# Patient Record
Sex: FEMALE | Race: WHITE | NOT HISPANIC OR LATINO | Employment: OTHER | RURAL
[De-identification: names, ages, dates, MRNs, and addresses within clinical notes are randomized per-mention and may not be internally consistent; named-entity substitution may affect disease eponyms.]

---

## 2020-03-13 ENCOUNTER — OFFICE VISIT (OUTPATIENT)
Dept: OTOLARYNGOLOGY | Facility: CLINIC | Age: 75
End: 2020-03-13

## 2020-03-13 VITALS — BODY MASS INDEX: 30.3 KG/M2 | OXYGEN SATURATION: 92 % | WEIGHT: 171 LBS | HEIGHT: 63 IN

## 2020-03-13 DIAGNOSIS — J31.0 CHRONIC RHINITIS: ICD-10-CM

## 2020-03-13 DIAGNOSIS — J37.0 CHRONIC LARYNGITIS: ICD-10-CM

## 2020-03-13 DIAGNOSIS — R09.89 GLOBUS SENSATION: Primary | ICD-10-CM

## 2020-03-13 DIAGNOSIS — K21.9 GASTROESOPHAGEAL REFLUX DISEASE, ESOPHAGITIS PRESENCE NOT SPECIFIED: ICD-10-CM

## 2020-03-13 PROCEDURE — 99203 OFFICE O/P NEW LOW 30 MIN: CPT | Performed by: OTOLARYNGOLOGY

## 2020-03-13 PROCEDURE — 31575 DIAGNOSTIC LARYNGOSCOPY: CPT | Performed by: OTOLARYNGOLOGY

## 2020-03-13 RX ORDER — LORAZEPAM 0.5 MG/1
0.25 TABLET ORAL EVERY 12 HOURS SCHEDULED
COMMUNITY

## 2020-03-13 RX ORDER — TACROLIMUS 1 MG/1
1 CAPSULE ORAL
COMMUNITY

## 2020-03-13 RX ORDER — LEVOFLOXACIN 250 MG/1
TABLET ORAL
COMMUNITY
End: 2020-06-16

## 2020-03-13 RX ORDER — CETIRIZINE HYDROCHLORIDE 10 MG/1
TABLET ORAL
COMMUNITY

## 2020-03-13 RX ORDER — PROPRANOLOL HYDROCHLORIDE 20 MG/1
20 TABLET ORAL DAILY
COMMUNITY

## 2020-03-13 RX ORDER — ATORVASTATIN CALCIUM 20 MG/1
TABLET, FILM COATED ORAL
COMMUNITY

## 2020-03-13 RX ORDER — AZELASTINE 1 MG/ML
SPRAY, METERED NASAL
COMMUNITY
Start: 2020-01-09 | End: 2020-06-16

## 2020-03-13 RX ORDER — AMLODIPINE BESYLATE 10 MG/1
TABLET ORAL
COMMUNITY

## 2020-03-13 RX ORDER — SERTRALINE HYDROCHLORIDE 100 MG/1
100 TABLET, FILM COATED ORAL
COMMUNITY

## 2020-03-13 RX ORDER — FLUTICASONE PROPIONATE 50 MCG
2 SPRAY, SUSPENSION (ML) NASAL DAILY
Qty: 16 G | Refills: 11 | Status: SHIPPED | OUTPATIENT
Start: 2020-03-13

## 2020-03-13 RX ORDER — FAMOTIDINE 20 MG/1
TABLET, FILM COATED ORAL
COMMUNITY
End: 2020-03-13

## 2020-03-13 RX ORDER — PANTOPRAZOLE SODIUM 40 MG/1
40 TABLET, DELAYED RELEASE ORAL DAILY
Qty: 30 TABLET | Refills: 11 | Status: SHIPPED | OUTPATIENT
Start: 2020-03-13 | End: 2020-05-15 | Stop reason: ALTCHOICE

## 2020-03-13 RX ORDER — CLOPIDOGREL BISULFATE 75 MG/1
TABLET ORAL
COMMUNITY
End: 2020-06-16

## 2020-03-17 NOTE — PROGRESS NOTES
"Subjective   Rocio Sánchez is a 75 y.o. female.     History of Present Illness   Patient presents with complaints of frequent throat clearing.  Always feels like she has mucus in her throat.  Symptoms are worse after eating.  Is on famotidine 20 mg a day but still has breakthrough symptoms of reflux despite this.  Also has intermittent nasal congestion and rhinorrhea that is present despite using Zyrtec.  No purulent rhinorrhea.  No hemoptysis.  Symptoms have been present daily for months.      The following portions of the patient's history were reviewed and updated as appropriate: allergies, current medications, past family history, past medical history, past social history, past surgical history and problem list.      Rocio Sánchez reports that she has quit smoking. She has never used smokeless tobacco.  Patient is not a tobacco user and has not been counseled for use of tobacco products    Family History   Problem Relation Age of Onset   • Diabetes Father    • Heart disease Father        Allergies   Allergen Reactions   • Phenergan [Promethazine Hcl] Mental Status Change     \"jittery\" cant be still         Current Outpatient Medications:   •  amLODIPine (NORVASC) 10 MG tablet, amlodipine 10 mg tablet  Take 1 tablet daily, Disp: , Rfl:   •  atorvastatin (LIPITOR) 20 MG tablet, atorvastatin 20 mg tablet  Take 1 tablet daily, Disp: , Rfl:   •  azelastine (ASTELIN) 0.1 % nasal spray, , Disp: , Rfl:   •  Calcium Carbonate (CALCIUM 600 PO), Calcium 600  Take 4 tablets per week, Disp: , Rfl:   •  cetirizine (ZYRTEC ALLERGY) 10 MG tablet, Zyrtec 10 mg tablet  Take 1 tablet every day by oral route., Disp: , Rfl:   •  Cholecalciferol (EQL VITAMIN D3) 50 MCG (2000 UT) capsule, D3-2000 50 mcg (2,000 unit) capsule  Take 1 tablet daily, Disp: , Rfl:   •  clopidogrel (PLAVIX) 75 MG tablet, clopidogrel 75 mg tablet  Take 1 tablet daily, Disp: , Rfl:   •  fluticasone (FLONASE) 50 MCG/ACT nasal spray, 2 sprays into the " nostril(s) as directed by provider Daily., Disp: 16 g, Rfl: 11  •  levoFLOXacin (LEVAQUIN) 250 MG tablet, levofloxacin 250 mg tablet, Disp: , Rfl:   •  LORazepam (ATIVAN) 0.5 MG tablet, Every 12 (Twelve) Hours., Disp: , Rfl:   •  Magnesium Gluconate (MAGNESIUM 27) 500 (27 Mg) MG tablet, magnesium 500 mg tablet  Take 4 tablets weekly, Disp: , Rfl:   •  Omega 3-6-9 Fatty Acids (OMEGA 3-6-9 PO), Adult Multi plus Omega-3, Disp: , Rfl:   •  pantoprazole (PROTONIX) 40 MG EC tablet, Take 1 tablet by mouth Daily., Disp: 30 tablet, Rfl: 11  •  Potassium 99 MG tablet, potassium 99 mg tablet  Take 4 tablets per week, Disp: , Rfl:   •  propranolol (INDERAL) 20 MG tablet, propranolol 20 mg tablet  Take 1 tablet twice daily, Disp: , Rfl:   •  sertraline (ZOLOFT) 100 MG tablet, sertraline 100 mg tablet  Take 1 tablet daily, Disp: , Rfl:   •  tacrolimus (PROGRAF) 1 MG capsule, tacrolimus 1 mg capsule  Take 4 tablets BID, Disp: , Rfl:     Past Medical History:   Diagnosis Date   • Disease of thyroid gland    • High blood pressure    • Sinusitis        Past Surgical History:   Procedure Laterality Date   • CHOLECYSTECTOMY     • HYSTERECTOMY     • LIVER TRANSPLANTATION           Review of Systems   HENT: Positive for congestion, postnasal drip and sneezing.    Eyes: Positive for itching.   Respiratory: Positive for cough and shortness of breath.    Gastrointestinal: Positive for diarrhea.   Endocrine: Positive for heat intolerance.   Neurological: Positive for dizziness and weakness.   Hematological: Bruises/bleeds easily.   Psychiatric/Behavioral:        Not assessed   All other systems reviewed and are negative.          Objective   Physical Exam  General: Well-developed well-nourished female in no acute distress.  Alert and oriented x-3. Head: Normocephalic. Face: Symmetrical strength and appearance. PERRL. EOMI. Voice: Slightly harsh.  Clears her throat frequently during the exam. Speech:Fluent  Ears: External ears no deformity,  canals no discharge, tympanic membranes intact clear and mobile bilaterally.  Nose: Nares show no discharge mass polyp or purulence.  Boggy mucosa is present.  No gross external deformity.  Septum: To the right  Oral cavity: Lips and gums without lesions.  Tongue and floor of mouth without lesions.  Parotid and submandibular ducts unobstructed.  No mucosal lesions on the buccal mucosa or vestibule of the mouth.  Pharynx: No erythema exudate mass or ulcer.  Tonsils absent.  Neck: No lymphadenopathy.  No thyromegaly.  Trachea and larynx midline.  No masses in the parotid or submandibular glands.    Procedure Note    Pre-operative Diagnosis: Patient presents with:  Sore Throat      Post-operative Diagnosis: Chronic laryngitis    Anesthesia: topical with xylocaine and neosynephrine    Endoscopy Type:  Flexible Laryngoscopy    Procedure Details:    The patient was placed in the sitting position.  After topical anesthesia and decongestion, the 4 mm laryngoscope was passed.  The nasal cavities, nasopharynx, oropharynx, hypopharynx, and larynx were all examined.  Vocal cords were examined during respiration and phonation.  The following findings were noted:    Findings: Previously noted nasal findings were confirmed. Nasopharynx without mass, hypopharynx and larynx without evidence of neoplasm. Vocal cord mobility intact. There is chronic appearing edema and erythema of the laryngeal structures consistent with chronic laryngitis.    Condition:  Stable.  Patient tolerated procedure well.    Complications:  None        Assessment/Plan   Rocio was seen today for sore throat.    Diagnoses and all orders for this visit:    Globus sensation    Chronic laryngitis    Gastroesophageal reflux disease, esophagitis presence not specified    Chronic rhinitis    Other orders  -     pantoprazole (PROTONIX) 40 MG EC tablet; Take 1 tablet by mouth Daily.  -     fluticasone (FLONASE) 50 MCG/ACT nasal spray; 2 sprays into the nostril(s) as  directed by provider Daily.      Plan: Explained findings to the patient.  I suspect her symptoms are caused by breakthrough acid reflux and postnasal drainage.  We will change her famotidine to pantoprazole 40 mg a day.  Add Flonase 2 sprays each nostril daily.  Reevaluate in 2 months.  Call sooner for problems.

## 2020-05-15 ENCOUNTER — OFFICE VISIT (OUTPATIENT)
Dept: OTOLARYNGOLOGY | Facility: CLINIC | Age: 75
End: 2020-05-15

## 2020-05-15 VITALS — OXYGEN SATURATION: 97 % | BODY MASS INDEX: 30.48 KG/M2 | WEIGHT: 172 LBS | HEIGHT: 63 IN

## 2020-05-15 DIAGNOSIS — J31.0 CHRONIC RHINITIS: ICD-10-CM

## 2020-05-15 DIAGNOSIS — K21.9 GASTROESOPHAGEAL REFLUX DISEASE, ESOPHAGITIS PRESENCE NOT SPECIFIED: ICD-10-CM

## 2020-05-15 DIAGNOSIS — J37.0 CHRONIC LARYNGITIS: ICD-10-CM

## 2020-05-15 DIAGNOSIS — R09.89 GLOBUS SENSATION: Primary | ICD-10-CM

## 2020-05-15 PROCEDURE — 31575 DIAGNOSTIC LARYNGOSCOPY: CPT | Performed by: OTOLARYNGOLOGY

## 2020-05-15 PROCEDURE — 99214 OFFICE O/P EST MOD 30 MIN: CPT | Performed by: OTOLARYNGOLOGY

## 2020-05-15 RX ORDER — FLUTICASONE PROPIONATE 44 UG/1
2 AEROSOL, METERED RESPIRATORY (INHALATION) DAILY
Qty: 10.6 G | Refills: 5 | Status: SHIPPED | OUTPATIENT
Start: 2020-05-15 | End: 2022-11-22

## 2020-05-15 RX ORDER — LEVOTHYROXINE SODIUM 50 MCG
50 TABLET ORAL DAILY
COMMUNITY
Start: 2020-04-30

## 2020-05-15 RX ORDER — SUCRALFATE ORAL 1 G/10ML
1 SUSPENSION ORAL 2 TIMES DAILY
Qty: 600 ML | Refills: 5 | Status: SHIPPED | OUTPATIENT
Start: 2020-05-15 | End: 2021-11-23

## 2020-05-15 RX ORDER — FAMOTIDINE 20 MG/1
20 TABLET, FILM COATED ORAL 2 TIMES DAILY
COMMUNITY
Start: 2020-04-28

## 2020-05-18 NOTE — PROGRESS NOTES
Subjective   Rocio Sánchez is a 75 y.o. female.       History of Present Illness   Patient was seen previously with frequent throat clearing.  Had breakthrough reflux symptoms despite being on famotidine as well as breakthrough nasal symptoms despite using Zyrtec.  Was placed on Flonase and pantoprazole.  Returns today stating she continues to have vigorous throat clearing.  Still has occasional symptoms of reflux despite using pantoprazole regularly.  Denies hemoptysis.  No purulent rhinorrhea.      The following portions of the patient's history were reviewed and updated as appropriate: allergies, current medications, past family history, past medical history, past social history, past surgical history and problem list.     reports that she has quit smoking. She has never used smokeless tobacco.   Patient is not a tobacco user and has not been counseled for use of tobacco products      Review of Systems   Constitutional: Negative for fever.   Respiratory: Positive for cough.            Objective   Physical Exam  General: Well-developed well-nourished female in no acute distress.  Alert and oriented x-3.  Voice:Strong. Speech:Fluent.  Once again clears her throat vigorously multiple times during the exam  Ears: External ears no deformity, canals no discharge, tympanic membranes intact clear and mobile bilaterally.  Nose: Nares show no discharge mass polyp or purulence.  Boggy mucosa is present.  No gross external deformity.   Oral cavity: Lips and gums without lesions.  Tongue and floor of mouth without lesions.  Parotid and submandibular ducts unobstructed.  No mucosal lesions on the buccal mucosa or vestibule of the mouth.  Pharynx: No erythema exudate mass or ulcer  Neck: No lymphadenopathy.  No thyromegaly.  Trachea and larynx midline.  No masses in the parotid or submandibular glands.  Procedure Note    Pre-operative Diagnosis:   Chief Complaint   Patient presents with   • Follow-up       Post-operative  Diagnosis: Chronic laryngitis    Anesthesia: topical with xylocaine and neosynephrine    Endoscopy Type:  Flexible Laryngoscopy    Procedure Details:    The patient was placed in the sitting position.  After topical anesthesia and decongestion, the 4 mm laryngoscope was passed.  The nasal cavities, nasopharynx, oropharynx, hypopharynx, and larynx were all examined.  Vocal cords were examined during respiration and phonation.  The following findings were noted:    Findings: No masses in the nose or nasopharynx.  Hypopharynx and tongue base are without evidence of lesion.  There is some prominence of the posterior pharyngeal wall suggestive of osteophytes of the cervical spine but no mucosal abnormality of the pharynx.  Endolarynx still shows some chronic appearing edema and erythema particularly posteriorly.  Vocal cord mobility is intact.    Condition:  Stable.  Patient tolerated procedure well.    Complications:  None    Assessment/Plan   Rocio was seen today for follow-up.    Diagnoses and all orders for this visit:    Globus sensation    Chronic laryngitis    Gastroesophageal reflux disease, esophagitis presence not specified    Chronic rhinitis    Other orders  -     sucralfate (CARAFATE) 1 GM/10ML suspension; Take 10 mL by mouth 2 (Two) Times a Day.  -     fluticasone (FLOVENT HFA) 44 MCG/ACT inhaler; Inhale 2 puffs Daily.      Plan: Add sucralfate 1 g twice a day and Flovent inhaler 2 puffs once a day and reevaluate in a month.  If no better consider a CT scan of the neck to evaluate the prominence of the posterior pharyngeal wall.

## 2020-06-16 ENCOUNTER — OFFICE VISIT (OUTPATIENT)
Dept: OTOLARYNGOLOGY | Facility: CLINIC | Age: 75
End: 2020-06-16

## 2020-06-16 VITALS — OXYGEN SATURATION: 93 % | BODY MASS INDEX: 30.92 KG/M2 | HEIGHT: 63 IN | WEIGHT: 174.5 LBS | TEMPERATURE: 98.5 F

## 2020-06-16 DIAGNOSIS — R09.89 GLOBUS SENSATION: Primary | ICD-10-CM

## 2020-06-16 DIAGNOSIS — R09.89 CHRONIC THROAT CLEARING: ICD-10-CM

## 2020-06-16 PROCEDURE — 99213 OFFICE O/P EST LOW 20 MIN: CPT | Performed by: OTOLARYNGOLOGY

## 2020-06-16 RX ORDER — ONDANSETRON 4 MG/1
TABLET, FILM COATED ORAL
COMMUNITY
Start: 2020-06-10 | End: 2022-11-22

## 2020-06-16 RX ORDER — MELATONIN 10 MG
CAPSULE ORAL
COMMUNITY

## 2020-06-16 RX ORDER — PANTOPRAZOLE SODIUM 40 MG/1
TABLET, DELAYED RELEASE ORAL
COMMUNITY
Start: 2020-05-29 | End: 2021-11-23

## 2020-06-16 RX ORDER — MECLIZINE HYDROCHLORIDE 25 MG/1
TABLET ORAL EVERY 8 HOURS SCHEDULED
COMMUNITY
Start: 2020-06-10

## 2020-06-16 RX ORDER — ASPIRIN 81 MG/1
81 TABLET, CHEWABLE ORAL DAILY
COMMUNITY

## 2020-06-16 RX ORDER — HYDROXYZINE HYDROCHLORIDE 25 MG/1
TABLET, FILM COATED ORAL
COMMUNITY
Start: 2020-06-10 | End: 2022-11-22

## 2020-06-16 NOTE — PROGRESS NOTES
Subjective   Rocio Beata Sánchez is a 75 y.o. female.       History of Present Illness   Patient is seen for follow-up of chronic throat clearing and globus sensation.  Has been tried on Flonase and pantoprazole and then subsequently sucralfate and inhaled Flovent.  Returns today stating the latest medicines have not helped at all and she continues to clear her throat frequently.  She says this seems to get worse when she gets excited.  Interestingly it gets better when she lies down.  Visualization of the larynx had shown evidence of chronic laryngitis and some mild prominence of the posterior pharyngeal wall that I thought may have been related to cervical spine osteophytes.  The following portions of the patient's history were reviewed and updated as appropriate: allergies, current medications, past family history, past medical history, past social history, past surgical history and problem list.     reports that she has quit smoking. She has never used smokeless tobacco.   Patient is not a tobacco user and has not been counseled for use of tobacco products      Review of Systems   Constitutional: Negative for fever.           Objective   Physical Exam  Exam: Female in no acute distress.  Clears her throat frequently throughout the exam  Ears: External ears no deformity, canals no discharge, tympanic membranes intact clear and mobile bilaterally.  Nose: Boggy mucosa no discharge or purulence  Oral cavity: Lips and gums without lesions.  Tongue and floor of mouth without lesions.  Parotid and submandibular ducts unobstructed.  No mucosal lesions on the buccal mucosa or vestibule of the mouth.  Pharynx: No erythema or exudate  Neck: No lymphadenopathy.  No thyromegaly.  Trachea and larynx midline.  No masses in the parotid or submandibular glands.      Assessment/Plan   Rocio was seen today for follow-up.    Diagnoses and all orders for this visit:    Globus sensation    Chronic throat clearing        Plan: I had  previously discussed with the patient the possibility of obtaining a CT scan of the neck and if it showed very prominent osteophytes at least letting her talk to a neurosurgeon/spine surgeon about this but she says she does not want to proceed with that and that she would not have surgery no matter what.  I told her that I had tried all of the medical remedies that I thought might be of benefit.  We discussed the possibility of a referral to [facility].  She says she is at [facility] frequently regarding her transplant and so I told her that if she decides she wanted to pursue an evaluation to just ask her transplant team to get her referred to the otolaryngology department at [facility], or I would be happy to coordinate this as well.  Otherwise I will see her again at her discretion.

## 2020-11-09 ENCOUNTER — HOSPITAL ENCOUNTER (OUTPATIENT)
Dept: GENERAL RADIOLOGY | Facility: HOSPITAL | Age: 75
Discharge: HOME | End: 2020-11-09

## 2020-11-09 ENCOUNTER — LAB (OUTPATIENT)
Dept: LAB | Facility: HOSPITAL | Age: 75
End: 2020-11-09

## 2020-11-09 ENCOUNTER — OFFICE VISIT (OUTPATIENT)
Dept: CARDIOLOGY | Facility: CLINIC | Age: 75
End: 2020-11-09

## 2020-11-09 VITALS
OXYGEN SATURATION: 97 % | SYSTOLIC BLOOD PRESSURE: 130 MMHG | HEIGHT: 63 IN | DIASTOLIC BLOOD PRESSURE: 82 MMHG | HEART RATE: 63 BPM | BODY MASS INDEX: 31.64 KG/M2 | WEIGHT: 178.6 LBS

## 2020-11-09 DIAGNOSIS — R00.2 PALPITATIONS: ICD-10-CM

## 2020-11-09 DIAGNOSIS — I10 ESSENTIAL HYPERTENSION: ICD-10-CM

## 2020-11-09 DIAGNOSIS — R06.02 SHORTNESS OF BREATH: Primary | ICD-10-CM

## 2020-11-09 DIAGNOSIS — R06.09 DYSPNEA ON EXERTION: Primary | ICD-10-CM

## 2020-11-09 DIAGNOSIS — E78.2 MIXED HYPERLIPIDEMIA: ICD-10-CM

## 2020-11-09 DIAGNOSIS — E66.09 CLASS 1 OBESITY DUE TO EXCESS CALORIES WITHOUT SERIOUS COMORBIDITY WITH BODY MASS INDEX (BMI) OF 30.0 TO 30.9 IN ADULT: ICD-10-CM

## 2020-11-09 LAB — NT-PROBNP SERPL-MCNC: 243.7 PG/ML (ref 0–900)

## 2020-11-09 PROCEDURE — 99204 OFFICE O/P NEW MOD 45 MIN: CPT | Performed by: INTERNAL MEDICINE

## 2020-11-09 PROCEDURE — 83880 ASSAY OF NATRIURETIC PEPTIDE: CPT | Performed by: INTERNAL MEDICINE

## 2020-11-09 PROCEDURE — 93000 ELECTROCARDIOGRAM COMPLETE: CPT | Performed by: INTERNAL MEDICINE

## 2020-11-09 PROCEDURE — 71046 X-RAY EXAM CHEST 2 VIEWS: CPT

## 2020-11-09 PROCEDURE — 36415 COLL VENOUS BLD VENIPUNCTURE: CPT | Performed by: INTERNAL MEDICINE

## 2020-11-09 RX ORDER — ESTRADIOL 0.1 MG/G
CREAM VAGINAL
COMMUNITY
Start: 2020-10-13

## 2020-11-09 NOTE — PROGRESS NOTES
Pulmonary Arterial Hypertension, Heart Failure & Valvular Heart Disease      Adams Haile M.D., Ph.D., [facility]         Johnny Reid MD  [address] [place], [place] [id number]    Thank you for asking me to see Rocio Sánchez for shortness of breath.    History of Present Illness  This is a 75 y.o. female with:    1.  Dyspnea  2.  Risk factors: Obese, hypertension, dyslipidemia, former smoker, liver transplant    Rocio Sánchez is a 75 y.o. female who presents for consultation today.  The patient is typically seen by Johnny Reid MD. The patient has a chief complaint of shortness of breath.     Patient is referred for exercise intolerance and exertional dyspnea.  She is a former smoker.  She is obese with a BMI greater than 30.  She denies prior pulmonary evaluation including PFTs.  She denies recent chest x-ray.  No lower extremity edema.  She has some palpitations.  She denies a history of MI, CHF or CAD.  No prior ischemia evaluations.  No resting, exertional or nocturnal angina.  She does have dyslipidemia.  This is managed by her PCP.  She is currently prescribed atorvastatin.  Medication compliant.  Denies side effects. She does have a history of liver failure and is s/p transplant about 6 years ago. She is seen in [place]. She does not recall the results of the TTE. She is unclear on why her liver failed. Liver transplant has been working well. She really isn't able to walk very much. She has not had a CXR. She has not had PFTs. She has not had a TTE. She did have pre-liver transplant stress testing. She does snore. She has EDS. She does have fatigue upon awakening.     Review of Systems - Review of Systems   Cardiovascular: Positive for dyspnea on exertion and palpitations. Negative for chest pain, claudication, cyanosis, irregular heartbeat, leg swelling, near-syncope, orthopnea, paroxysmal nocturnal dyspnea and syncope.   Respiratory: Positive for  "shortness of breath. Negative for cough, hemoptysis, snoring, sputum production and wheezing.         All other systems were reviewed and were negative.    family history includes Diabetes in her father; Heart disease in her father; Hypertension in her paternal grandmother.     reports that she has quit smoking. She has never used smokeless tobacco. She reports previous alcohol use. She reports that she does not use drugs.    Allergies   Allergen Reactions   • Cymbalta [Duloxetine Hcl] Provider Review Needed   • Phenergan [Promethazine Hcl] Mental Status Change     \"jittery\" cant be still   • Reglan [Metoclopramide] Provider Review Needed   • Sulfa Antibiotics GI Intolerance         Current Outpatient Medications:   •  amLODIPine (NORVASC) 10 MG tablet, amlodipine 10 mg tablet  Take 1 tablet daily, Disp: , Rfl:   •  aspirin 81 MG chewable tablet, Chew 81 mg Daily., Disp: , Rfl:   •  atorvastatin (LIPITOR) 20 MG tablet, atorvastatin 20 mg tablet  Take 1 tablet daily, Disp: , Rfl:   •  Calcium Carbonate (CALCIUM 600 PO), Calcium 600  Take 4 tablets per week, Disp: , Rfl:   •  cetirizine (ZYRTEC ALLERGY) 10 MG tablet, Zyrtec 10 mg tablet  Take 1 tablet every day by oral route., Disp: , Rfl:   •  Cholecalciferol (EQL VITAMIN D3) 50 MCG (2000 UT) capsule, D3-2000 50 mcg (2,000 unit) capsule  Take 1 tablet daily, Disp: , Rfl:   •  famotidine (PEPCID) 20 MG tablet, 20 mg 2 (Two) Times a Day., Disp: , Rfl:   •  fluticasone (FLOVENT HFA) 44 MCG/ACT inhaler, Inhale 2 puffs Daily., Disp: 10.6 g, Rfl: 5  •  hydrOXYzine (ATARAX) 25 MG tablet, hydroxyzine HCl 25 mg tablet  Take 1 to 2 tablets every 6 hours, Disp: , Rfl:   •  LORazepam (ATIVAN) 0.5 MG tablet, 0.25 mg Every 12 (Twelve) Hours., Disp: , Rfl:   •  Magnesium Gluconate (MAGNESIUM 27) 500 (27 Mg) MG tablet, magnesium 500 mg tablet  Take 4 tablets weekly, Disp: , Rfl:   •  meclizine (ANTIVERT) 25 MG tablet, Every 8 (Eight) Hours., Disp: , Rfl:   •  Melatonin 10 MG " "capsule, melatonin  10 mg daily, Disp: , Rfl:   •  Omega 3-6-9 Fatty Acids (OMEGA 3-6-9 PO), Adult Multi plus Omega-3, Disp: , Rfl:   •  ondansetron (ZOFRAN) 4 MG tablet, , Disp: , Rfl:   •  pantoprazole (PROTONIX) 40 MG EC tablet, , Disp: , Rfl:   •  Potassium 99 MG tablet, potassium 99 mg tablet  Take 4 tablets per week, Disp: , Rfl:   •  propranolol (INDERAL) 20 MG tablet, propranolol 20 mg tablet  Take 1 tablet twice daily, Disp: , Rfl:   •  sertraline (ZOLOFT) 100 MG tablet, 100 mg. 1.5 mg daily, Disp: , Rfl:   •  sucralfate (CARAFATE) 1 GM/10ML suspension, Take 10 mL by mouth 2 (Two) Times a Day., Disp: 600 mL, Rfl: 5  •  SYNTHROID 50 MCG tablet, Take 50 mcg by mouth Daily., Disp: , Rfl:   •  tacrolimus (PROGRAF) 1 MG capsule, Take  by mouth Daily., Disp: , Rfl:   •  estradiol (ESTRACE) 0.1 MG/GM vaginal cream, , Disp: , Rfl:   •  fluticasone (FLONASE) 50 MCG/ACT nasal spray, 2 sprays into the nostril(s) as directed by provider Daily., Disp: 16 g, Rfl: 11      Physical Exam:  Vitals:    11/09/20 1305   BP: 130/82   BP Location: Left arm   Patient Position: Sitting   Cuff Size: Adult   Pulse: 63   SpO2: 97%   Weight: 81 kg (178 lb 9.6 oz)   Height: 160 cm (63\")   PainSc: 0-No pain     Pulse Pressure: normal  Pulse Ox: Normal  on room air  General: alert, appears stated age and cooperative     Body Habitus: obese    HEENT: Head: Normocephalic, no lesions, without obvious abnormality. No arcus senilis, xanthelasma or xanthomas.  No malar flush, proptosis, xanthomas or malar flush.   Neuro: alert, oriented x3  speech normal in context and clarity  memory intact grossly  Pulses: 2+ and symmetric  JVP: Volume/Pulsation: Normal.  Normal waveforms with a, x, and v waves.   Appropriate inspiratory decrease.  No Kussmaul's. No Yuly's.   Normal x and y descent.  Carotid Exam: no bruit normal pulsation bilaterally   Carotid Volume: normal.     Subclavian Bruit: absent  Vertebral Bruit: absent  Respirations: no " increased work of breathing   Chest:  Normal    Pulmonary:Normal     Precordium: Normal impulses. P2 is not palpable.  RV Heave: absent  LV Heave: absent  [place]:  normal size and placement  Palpable S4: absent.  Heart rate: normal    Heart Rhythm: regular     Heart Sounds: S1: normal  S2: normal intensity  S3: absent   S4: absent  Opening Snap: absent    A2-OS:  no  Pericardial Rub:  Absent: Yes      Ejection click: None      Murmurs:  present    Murmur 1 Type: systolic  Grade: 1/6    Timing: early systolic  Location:   base   Description:ejection  Radiation:  nonradiating  Abdomen:   Abdominal Aorta: no bruits  Renal Arteries: no bruits  Extremity: moves all extremities equally.       DATA REVIEWED:     EKG. I personally reviewed and interpreted the EKG.    EKG shows sinus mechanism.  Normal intervals.  Normal axis.  No ischemic changes.  No prior EKG.        --------------------------------------------------------------------------------------------------           The ASCVD Risk score ([place] JAIRO Jr., et al., 2013) failed to calculate for the following reasons:    The systolic blood pressure is missing    Cannot find a previous HDL lab    Cannot find a previous total cholesterol lab     Laboratory evaluations:    No results found for: GLUCOSE, BUN, CREATININE, EGFRIFNONA, EGFRIFAFRI, BCR, K, CO2, CALCIUM, PROTENTOTREF, ALBUMIN, LABIL2, BILIRUBIN, AST, ALT  No results found for: WBC, HGB, HCT, MCV, PLT  No results found for: CHOL, CHLPL, TRIG, HDL, LDL, LDLDIRECT  No results found for: TSH, Y3GVRUX, G7LALBE, THYROIDAB  No results found for: CKTOTAL, CKMB, CKMBINDEX, TROPONINI, TROPONINT  No results found for: HGBA1C  No results found for: DDIMER  No results found for: ALT  No results found for: HGBA1C  No results found for: GLUF, MICROALBUR, CREATININE  No results found for: IRON, TIBC, FERRITIN  No results found for: INR, PROTIME    Assessment/Plan     1. Dyspnea on exertion.  My suspicion is multifactorial dyspnea.   She is deconditioned and fairly sedentary.  She is obese with a BMI of 31.6.  She is a former smoker, so she also needs a pulmonary evaluation to exclude pulmonary diseases such as COPD.  While I did not appreciate any elevated filling pressures on examination today she has numerous risk factors for both the development of congestive heart failure and obstructive coronary artery disease.  I recommended a thorough structural evaluation and ischemia evaluation because of her obesity, exercise intolerance and dyspnea she will not be able to exercise on a treadmill.  Therefore, risks and benefits were discussed concerning a dobutamine stress echocardiogram. She may have sleep apena. I will defer to her PCP on this since she is in [place].   -PFTs, 6MWT, TTE  -DSE  -BNP, PA/LAT CXR  -PCP to consider sleep eval    2. Cardiac Risk Assessments based on 2019 ACCF guidelines:  A team-based care approach is recommended for the control of risk factors associated with ASCAD.  As such, Rocio Sánchez was requested to have ongoing follow-up with their PCP.  I've recommended the patient continue current medications, if any, as prescribed by the primary care provider. BP is an important modifiable risk factor. I have asked the patient to see their PCP for BP monitoring and management, as needed. A diet emphasizing intake of vegetables, fruits, nuts, whole grains and fish is recommended. Physical activity recommendations were provided by literature. They were also provided with information regarding maintaining a healthy weight, heart-healthy dietary pattern and DASH information.  The patient's BMI is recommended to be calculated at least annually.  The patient's BMI is Body mass index is 31.64 kg/m²..  Tobacco status is assessed at every visit based on established guidelines.  The patient's nicotine status: Social History    Tobacco Use      Smoking status: Former Smoker      Smokeless tobacco: Never Used    3. Palpitations.    -TTE, MCOT    Return in about 2 months (around 1/9/2021).

## 2020-11-09 NOTE — PATIENT INSTRUCTIONS
Dr. Haile has recommended a pharmacologic (dobutamine) stress test with echocardiography.    What is a Dobutamine Stress Echo Test?     Why do I need a stress test?     This test helps your physician determine how your heart functions when it is made to work harder by injecting dobutamine. Dobutamine is a drug that has an effect on the heart similar to exercise. This test is done on patients that are unable to exercise adequately or have severe lung disease. An echocardiogram, the ultrasound study of the heart, evaluates the heart’s size, how strongly it pumps blood, and how well the valves are working.     The dobutamine stress echo test is useful to determine:   • If there is a decreased supply of blood and oxygen to the heart at rest as well as with exercise   • If there is reduced movement of the heart muscle.   • Overall level of cardiovascular conditioning   • How quickly the heart recovers after exercise   • How hard the heart can work before symptoms develop   • Estimate the risk of surgery that can cause cardiac complications     What happens during the test?   • An echo technician will do a resting scan of your heart while you are lying down on an exam table. You will lie on your back and on your left side  . • A stress  will prep ten small areas on your chest and place electrodes (small, flat, sticky patches) on these areas. The electrodes are attached to an EKG monitor that charts your heart’s electrical activity during the test.   • You will be lying down on an exam table while the technician performs a resting EKG and blood pressure.   • A nurse will place an IV into a vein in your arm or hand. Next, your heart will be “stressed” by injecting a medication called dobutamine into the IV. This medication will increase your heart rate.   • Please tell the technician immediately if you have chest pain, shortness of breath, or any other unusual symptoms at any time.   • The stress lab staff  will watch for any changes on the EKG monitor that suggest the test should be stopped. The testing area is supervised by a physician.   • The echo technician will take images of your heart every three minutes during the test. • At the peak effects of dobutamine, a second echocardiogram will be taken to visualize the heart’s motion with exercise.   • Your heart rate, blood pressure, and EKG will continue to be monitored until the levels are returning to normal. One more echo scan will be done as your heart rate returns to normal    The risks     This test is very safe and there are usually no problems. There is a small risk of an abnormal heartbeat, chest pain or nausea and, if this happens, the appropriate action will be taken. On very rare occasions (approximately 1 in 2000) the test is associated with life-threatening events. If you have any questions about the risks associated with the procedure, your medical team will be able to discuss them with you at the appointment.      Instruction checklist for the dobutamine stress echo test:     . • Bring a list of medications you take with you to the test. Include the name and dosage amounts of each medicine. This information can be found on the prescription or bottle label.     You may take any of your regular morning medications unless otherwise instructed.       § If you have asthma, please bring your inhaler medication with you.     § If you have diabetes, ask your physician how to adjust your medications the day of your test.     • Please refrain from any strenuous exercise or activities the day before your test, or the day of the test     • Do Not eat or drink anything except for small amounts of water for 4 hours prior to your testing time.      • Do Not use lotion or powders on your chest the day of the test.     • Wear loose fitting, comfortable clothing. Pants or shorts and short sleeve shirts are preferred. (No long sleeves.) Do not wear one-piece  undergarments or body suits.      • The Dobutamine Stress Echo takes about one hour to complete including check-in time and actual test time.      • If you need to CANCEL OR CHANGE your appointment, please call [phone].     • If you have any QUESTIONS about the test, Please call [phone] and ask to speak to Dr. Lazara Montenegro's Medical Assistant. Please leave a message if prompted to do so. We will return your call as soon as possible.     • We request a 24 hour notice for changes or cancellations.    You MUST complete the DSE within 30 calendar days of being ordered by Dr. Haile.     You MUST arrive for your DSE appointment 10 minutes BEFORE the scheduled time, or your test will be rescheduled.    Results:    Dr. Haile will be physically present during your dobutamine stress echo.  You will be given the results of your test in real time.    Pulmonary Function Tests  Pulmonary function tests (PFTs) are used to measure how well your lungs work, find out what is causing your lung problems, and figure out the best treatment for you. You may have PFTs:  · When you have an illness involving the lungs.  · To follow changes in your lung function over time if you have a chronic lung disease.  · If you are an industrial . This checks the effects of being exposed to chemicals over a long period of time.  · To check lung function before having surgery or other procedures.  · To check your lungs if you smoke.  · To check if prescribed medicines or treatments are helping your lungs.  Your results will be compared to the expected lung function of someone with healthy lungs who is similar to you in:  · Age.  · Gender.  · Height.  · Weight.  · Race or ethnicity.  This is done to show how your lungs compare to normal lung function (percent predicted). This is how your health care provider knows if your lung function is normal or not. If you have had PFTs done before, your health care provider will  compare your current results with past results. This shows if your lung function is better, worse, or the same as before.  Tell a health care provider about:  · Any allergies you have.  · All medicines you are taking, including inhaler or nebulizer medicines, vitamins, herbs, eye drops, creams, and over-the-counter medicines.  · Any blood disorders you have.  · Any surgeries you have had, especially recent eye surgery, abdominal surgery, or chest surgery. These can make PFTs difficult or unsafe.  · Any medical conditions you have, including chest pain or heart problems, tuberculosis, or respiratory infections such as pneumonia, a cold, or the flu.  · Any fear of being in closed spaces (claustrophobia). Some of your tests may be in a closed space.  What are the risks?  Generally, this is a safe procedure. However, problems may occur, including:  · Light-headedness due to over-breathing (hyperventilation).  · An asthma attack from deep breathing.  · A collapsed lung.  What happens before the procedure?  · Take over-the-counter and prescription medicines only as told by your health care provider. If you take inhaler or nebulizer medicines, ask your health care provider which medicines you should take on the day of your testing. Some inhaler medicines may interfere with PFTs if they are taken shortly before the tests.  · Follow your health care provider's instructions on eating and drinking restrictions. This may include avoiding eating large meals and drinking alcohol before the testing.  · Do not use any products that contain nicotine or tobacco, such as cigarettes and e-cigarettes. If you need help quitting, ask your health care provider.  · Wear comfortable clothing that will not interfere with breathing.  What happens during the procedure?    · You will be given a soft nose clip to wear. This is done so all of your breaths will go through your mouth instead of your nose.  · You will be given a germ-free (sterile)  mouthpiece. It will be attached to a machine that measures your breathing (spirometer).  · You will be asked to do various breathing maneuvers. The maneuvers will be done by breathing in (inhaling) and breathing out (exhaling). You may be asked to repeat the maneuvers several times before the testing is done.  · It is important to follow the instructions exactly to get accurate results. Make sure to blow as hard and as fast as you can when you are told to do so.  · You may be given a medicine that makes the small air passages in your lungs larger (bronchodilator) after testing has been done. This medicine will make it easier for you to breathe.  · The tests will be repeated after the bronchodilator has taken effect.  · You will be monitored carefully during the procedure for faintness, dizziness, trouble breathing, or any other problems.  The procedure may vary among health care providers and hospitals.  What happens after the procedure?  · It is up to you to get your test results. Ask your health care provider, or the department that is doing the tests, when your results will be ready. After you have received your test results, talk with your health care provider about treatment options, if necessary.  Summary  · Pulmonary function tests (PFTs) are used to measure how well your lungs work, find out what is causing your lung problems, and figure out the best treatment for you.  · Wear comfortable clothing that will not interfere with breathing.  · It is up to you to get your test results. After you have received them, talk with your health care provider about treatment options, if necessary.  This information is not intended to replace advice given to you by your health care provider. Make sure you discuss any questions you have with your health care provider.  Document Released: 08/10/2005 Document Revised: 12/15/2017 Document Reviewed: 11/09/2017  [employer] Patient Education © 2020 [employer]    Dr. Haile  has recommended a Six-Minute Walk Test    What Is the Six-Minute Walk Test?    The American [employer] Society describes the six-minute walk test as a measure of functional status or fitness. It is used as a simple measure of aerobic exercise capacity. The results of this test may or may not lead your doctor to do more sophisticated measures of your heart and lung function. During this test, you walk at your normal pace for six minutes. This test can be used to monitor your response to treatments for heart, lung and other health problems. This test is commonly used for people with pulmonary hypertension, interstitial lung disease, pre-lung transplant evaluation or COPD.      What to Expect When Doing the Test      Preparing for your test:  · Wear clothes and shoes that are comfortable.  · You may use your usual walking aids such as a cane or walker, if needed.  · It is okay to eat a light meal prior to your test.  · Take your usual medications.  · Do not exercise within two hours of testing.      During the test:  · The fabiano will measure your blood pressure, pulse and oxygen level usually with a pulse oximeter before you start to walk.  · You should be given the following instructions: The object of the test is to walk as far as possible for six minutes. You will walk at your normal pace to a chair or cone, and turn around. And you continue to walk back and forth for six minutes.  · Let the staff know if you are having chest pain or breathing difficulty.  · It is acceptable to slow down, rest or stop. After every minute interval, you will be given an update.      Safety:  · The fabiano will watch to see if you have breathing difficulty or chest pain.  · Oxygen and other supplies will be nearby if you need them.      Understanding the Results  The results of your test are then compared to what is known to be normal for people in your weight, height, gender and age categories. They can be used to estimate response to  treatment, especially if repeated after a time interval, for instance six months or a year later. After your test, your provider may change your medication or exercise program based on your results.      What Are the Risks?  This is a low-risk medical evaluation. Medical help is easily available while the test is being done.          This content was developed in partnership with the [employer], the philanthropic arm of the [employer].  Approved by Scientific and Medical Editorial Review Panel. Last reviewed May 31, 2017.        Natriuretic Peptides Test  Why am I having this test?  The natriuretic peptides test helps your health care provider manage heart failure and other heart abnormalities. You may have this test:  · If you have symptoms that may be caused by heart failure or a heart attack, such as shortness of breath or fatigue.  · To monitor the treatment and advancement (progression) of heart disease.  · To check for signs that your body's disease-fighting (immune) system is attacking your newly transplanted heart (rejection).  This test can help your health care provider determine if your symptoms are caused from heart failure, or from another condition.  What is being tested?  Natriuretic peptides (NPs) are hormones that the heart cells make in response to heart failure. They help maintain blood pressure and the balance of fluids in the body when the heart is not able to do so. This test measures the amount of three types of NPs in the blood:  · ANP (atrial natriuretic peptide). This is made by the part of the heart that receives blood from the body (atrium).  · BNP (B-type natriuretic peptide). This is made by the heart's main pumping chamber (left ventricle).  · CNP (C-type natriuretic peptide). This is made by the lining of the blood vessels (endothelial cells).  What kind of sample is taken?    A blood sample is required for this test. It is usually collected by  inserting a needle into a blood vessel.  Tell a health care provider about:  · Any allergies you have.  · All medicines you are taking, including vitamins, herbs, eye drops, creams, and over-the-counter medicines.  · Any surgeries you have had.  · Any medical conditions you have.  · Whether you are pregnant or may be pregnant.  How are the results reported?  Your test results will be reported as values for each type of NP. Your health care provider will compare your results to normal ranges that were established after testing a large group of people (reference ranges). Reference ranges may vary among labs and hospitals. For this test, common normal reference ranges are:  · ANP: 22-77 pg/mL or 22-77 ng/L (SI units).  · BNP: 0-100 pg/mL or 0-100 ng/L (SI units).  · CNP: These values are yet to be determined.  What do the results mean?  Results that are within the reference ranges are considered normal. These results may mean that:  · You do not have heart failure.  · You have heart failure or a different heart disease, and your treatment is working effectively.  Results that are higher than the reference ranges may mean that:  · You have heart failure.  · You are having a heart attack.  · You have high blood pressure (hypertension).  · Your body is rejecting your transplanted heart.  Talk with your health care provider about what your results mean.  Questions to ask your health care provider  Ask your health care provider, or the department that is doing the test:  · When will my results be ready?  · How will I get my results?  · What are my treatment options?  · What other tests do I need?  · What are my next steps?  Summary  · The natriuretic peptides test helps diagnose heart failure and other heart abnormalities.  · Natriuretic peptides (NPs) are hormones that the heart cells make in response to heart failure. They help maintain blood pressure and the balance of fluids in the body when the heart is not able to do  so.  · You may have this test if you have symptoms that may be caused by heart failure or a heart attack, such as shortness of breath or fatigue.  This information is not intended to replace advice given to you by your health care provider. Make sure you discuss any questions you have with your health care provider.  Document Released: 01/20/2006 Document Revised: 11/30/2018 Document Reviewed: 08/28/2018  [employer] Patient Education © 2020 [employer]

## 2020-11-10 ENCOUNTER — DOCUMENTATION (OUTPATIENT)
Dept: CARDIOLOGY | Facility: CLINIC | Age: 75
End: 2020-11-10

## 2020-11-10 LAB
QT INTERVAL: 410 MS
QTC INTERVAL: 419 MS

## 2020-11-10 NOTE — PROGRESS NOTES
Adams Haiel MD PhD  Princess Kramer RN             Can you also let her know that her chest x-ray was, overall, normal?  She does have some osteopenia but her lung fields are normal.  No evidence of fluid.      Adams Haile MD PhD  Princess Kramer RN             Please let her know that her BNP was normal.      Patient notified and had no questions at this time

## 2020-11-24 ENCOUNTER — APPOINTMENT (OUTPATIENT)
Dept: CARDIOLOGY | Facility: HOSPITAL | Age: 75
End: 2020-11-24

## 2020-12-22 ENCOUNTER — TELEPHONE (OUTPATIENT)
Dept: CARDIOLOGY | Facility: CLINIC | Age: 75
End: 2020-12-22

## 2020-12-22 NOTE — TELEPHONE ENCOUNTER
Tried to call pt  back and let him know results would be given after pt has completed all tests.     No answer, left msg

## 2020-12-22 NOTE — TELEPHONE ENCOUNTER
Pt  called and said pt wore a monitor for a little while back in November. He would like a call back with the results from when she was wearing that monitor.     Pt , Eduar, call back is [phone]    Thank you.

## 2021-01-14 ENCOUNTER — TELEPHONE (OUTPATIENT)
Dept: CARDIOLOGY | Facility: CLINIC | Age: 76
End: 2021-01-14

## 2021-01-14 NOTE — TELEPHONE ENCOUNTER
Attempted to contact patient with date and time of stress echo. Voicemail left asking the patient to call the office.

## 2021-01-15 ENCOUNTER — TELEPHONE (OUTPATIENT)
Dept: CARDIOLOGY | Facility: CLINIC | Age: 76
End: 2021-01-15

## 2021-01-15 NOTE — TELEPHONE ENCOUNTER
----- Message from Adams Haile MD PhD sent at 1/13/2021  8:13 AM CST -----  Reschedule tests/appt  ----- Message -----  From: Moni Mujica MA  Sent: 1/13/2021   7:47 AM CST  To: Adams Haile MD PhD    She did not do stress echo, echo, pft, or walk test. The other things were completed. What would you like to do about her appt tomorrow. I thing I tried calling her and got no answer.       Patient rescheduled for stress echo on January 26 at 12p. Instructions provided to the patient. She was transferred to the appt desk to reschedule all other testing.

## 2021-01-26 ENCOUNTER — HOSPITAL ENCOUNTER (OUTPATIENT)
Dept: CARDIOLOGY | Facility: HOSPITAL | Age: 76
Discharge: HOME | End: 2021-01-26

## 2021-01-26 ENCOUNTER — TELEPHONE (OUTPATIENT)
Dept: CARDIOLOGY | Facility: CLINIC | Age: 76
End: 2021-01-26

## 2021-01-26 VITALS — WEIGHT: 170 LBS | BODY MASS INDEX: 30.11 KG/M2

## 2021-01-26 LAB
BH CV STRESS BP STAGE 1: NORMAL
BH CV STRESS BP STAGE 2: NORMAL
BH CV STRESS BP STAGE 3: NORMAL
BH CV STRESS BP STAGE 4: NORMAL
BH CV STRESS DOSE DOBUTAMINE STAGE 1: 10
BH CV STRESS DOSE DOBUTAMINE STAGE 2: 20
BH CV STRESS DOSE DOBUTAMINE STAGE 3: 30
BH CV STRESS DOSE DOBUTAMINE STAGE 4: 40
BH CV STRESS DURATION MIN STAGE 1: 3
BH CV STRESS DURATION MIN STAGE 2: 3
BH CV STRESS DURATION MIN STAGE 3: 3
BH CV STRESS DURATION MIN STAGE 4: 3
BH CV STRESS DURATION SEC STAGE 1: 0
BH CV STRESS DURATION SEC STAGE 2: 0
BH CV STRESS DURATION SEC STAGE 3: 0
BH CV STRESS DURATION SEC STAGE 4: 42
BH CV STRESS ECHO POST STRESS EJECTION FRACTION EF: 75 %
BH CV STRESS HR STAGE 1: 66
BH CV STRESS HR STAGE 2: 71
BH CV STRESS HR STAGE 3: 115
BH CV STRESS HR STAGE 4: 123
BH CV STRESS PROTOCOL 1: NORMAL
BH CV STRESS RECOVERY BP: NORMAL MMHG
BH CV STRESS RECOVERY HR: 88 BPM
BH CV STRESS STAGE 1: 1
BH CV STRESS STAGE 2: 2
BH CV STRESS STAGE 3: 3
BH CV STRESS STAGE 4: 4
MAXIMAL PREDICTED HEART RATE: 147 BPM
PERCENT MAX PREDICTED HR: 86.39 %
STRESS BASELINE BP: NORMAL MMHG
STRESS BASELINE HR: 67 BPM
STRESS PERCENT HR: 102 %
STRESS POST ESTIMATED WORKLOAD: 1 METS
STRESS POST EXERCISE DUR MIN: 12 MIN
STRESS POST EXERCISE DUR SEC: 41 SEC
STRESS POST PEAK BP: NORMAL MMHG
STRESS POST PEAK HR: 127 BPM
STRESS TARGET HR: 125 BPM

## 2021-01-26 PROCEDURE — 25010000002 ATROPINE PER 0.01 MG: Performed by: NURSE PRACTITIONER

## 2021-01-26 PROCEDURE — 93320 DOPPLER ECHO COMPLETE: CPT

## 2021-01-26 PROCEDURE — 93351 STRESS TTE COMPLETE: CPT | Performed by: INTERNAL MEDICINE

## 2021-01-26 PROCEDURE — 93325 DOPPLER ECHO COLOR FLOW MAPG: CPT

## 2021-01-26 PROCEDURE — 93350 STRESS TTE ONLY: CPT

## 2021-01-26 PROCEDURE — 93017 CV STRESS TEST TRACING ONLY: CPT

## 2021-01-26 PROCEDURE — 25010000002 DOBUTAMINE: Performed by: INTERNAL MEDICINE

## 2021-01-26 RX ORDER — SODIUM CHLORIDE 0.9 % (FLUSH) 0.9 %
10 SYRINGE (ML) INJECTION ONCE
Status: COMPLETED | OUTPATIENT
Start: 2021-01-26 | End: 2021-01-26

## 2021-01-26 RX ORDER — ATROPINE SULFATE 1 MG/ML
0.5 INJECTION, SOLUTION INTRAMUSCULAR; INTRAVENOUS; SUBCUTANEOUS ONCE
Status: COMPLETED | OUTPATIENT
Start: 2021-01-26 | End: 2021-01-26

## 2021-01-26 RX ADMIN — Medication 10 ML: at 12:14

## 2021-01-26 RX ADMIN — DOBUTAMINE 40 MCG/KG/MIN: 12.5 INJECTION, SOLUTION, CONCENTRATE INTRAVENOUS at 12:23

## 2021-01-26 RX ADMIN — ATROPINE SULFATE 0.5 MG: 1 INJECTION, SOLUTION INTRAMUSCULAR; INTRAVENOUS; SUBCUTANEOUS at 12:14

## 2021-01-26 NOTE — TELEPHONE ENCOUNTER
Adams Haile MD PhD  Princess Kramer RN             Please let her know that her stress echo showed no evidence of ischemia.      Left message

## 2021-03-30 ENCOUNTER — PROCEDURE VISIT (OUTPATIENT)
Dept: PULMONOLOGY | Facility: CLINIC | Age: 76
End: 2021-03-30

## 2021-03-30 ENCOUNTER — OFFICE VISIT (OUTPATIENT)
Dept: CARDIOLOGY | Facility: CLINIC | Age: 76
End: 2021-03-30

## 2021-03-30 DIAGNOSIS — R06.09 DYSPNEA ON EXERTION: ICD-10-CM

## 2021-03-30 LAB
BH CV ECHO MEAS - ACS: 1.6 CM
BH CV ECHO MEAS - AI DEC SLOPE: 133 CM/SEC^2
BH CV ECHO MEAS - AI MAX PG: 50.1 MMHG
BH CV ECHO MEAS - AI MAX VEL: 354 CM/SEC
BH CV ECHO MEAS - AI P1/2T: 779.6 MSEC
BH CV ECHO MEAS - AO MAX PG (FULL): 9.4 MMHG
BH CV ECHO MEAS - AO MAX PG: 13.7 MMHG
BH CV ECHO MEAS - AO MEAN PG (FULL): 4 MMHG
BH CV ECHO MEAS - AO MEAN PG: 6 MMHG
BH CV ECHO MEAS - AO ROOT AREA (BSA CORRECTED): 1.6
BH CV ECHO MEAS - AO ROOT AREA: 6.6 CM^2
BH CV ECHO MEAS - AO ROOT DIAM: 2.9 CM
BH CV ECHO MEAS - AO V2 MAX: 185 CM/SEC
BH CV ECHO MEAS - AO V2 MEAN: 108 CM/SEC
BH CV ECHO MEAS - AO V2 VTI: 37.4 CM
BH CV ECHO MEAS - ASC AORTA: 3.4 CM
BH CV ECHO MEAS - AVA(I,A): 2 CM^2
BH CV ECHO MEAS - AVA(I,D): 2 CM^2
BH CV ECHO MEAS - AVA(V,A): 1.7 CM^2
BH CV ECHO MEAS - AVA(V,D): 1.7 CM^2
BH CV ECHO MEAS - BSA(HAYCOCK): 1.9 M^2
BH CV ECHO MEAS - BSA: 1.8 M^2
BH CV ECHO MEAS - BZI_BMI: 30.1 KILOGRAMS/M^2
BH CV ECHO MEAS - BZI_METRIC_HEIGHT: 160 CM
BH CV ECHO MEAS - BZI_METRIC_WEIGHT: 77.1 KG
BH CV ECHO MEAS - EDV(CUBED): 38.6 ML
BH CV ECHO MEAS - EDV(MOD-SP2): 39.3 ML
BH CV ECHO MEAS - EDV(MOD-SP4): 42 ML
BH CV ECHO MEAS - EDV(TEICH): 46.8 ML
BH CV ECHO MEAS - EF(CUBED): 59.5 %
BH CV ECHO MEAS - EF(MOD-SP2): 56.5 %
BH CV ECHO MEAS - EF(MOD-SP4): 57.9 %
BH CV ECHO MEAS - EF(TEICH): 52.3 %
BH CV ECHO MEAS - EPSS: 0.6 CM
BH CV ECHO MEAS - ESV(CUBED): 15.6 ML
BH CV ECHO MEAS - ESV(MOD-SP2): 17.1 ML
BH CV ECHO MEAS - ESV(MOD-SP4): 17.7 ML
BH CV ECHO MEAS - ESV(TEICH): 22.3 ML
BH CV ECHO MEAS - FS: 26 %
BH CV ECHO MEAS - IVS/LVPW: 0.87
BH CV ECHO MEAS - IVSD: 0.92 CM
BH CV ECHO MEAS - LA DIMENSION: 3.3 CM
BH CV ECHO MEAS - LA/AO: 1.1
BH CV ECHO MEAS - LV DIASTOLIC VOL/BSA (35-75): 23.3 ML/M^2
BH CV ECHO MEAS - LV MASS(C)D: 96.7 GRAMS
BH CV ECHO MEAS - LV MASS(C)DI: 53.6 GRAMS/M^2
BH CV ECHO MEAS - LV MAX PG: 4.2 MMHG
BH CV ECHO MEAS - LV MEAN PG: 2 MMHG
BH CV ECHO MEAS - LV SYSTOLIC VOL/BSA (12-30): 9.8 ML/M^2
BH CV ECHO MEAS - LV V1 MAX: 103 CM/SEC
BH CV ECHO MEAS - LV V1 MEAN: 71.3 CM/SEC
BH CV ECHO MEAS - LV V1 VTI: 23.5 CM
BH CV ECHO MEAS - LVIDD: 3.4 CM
BH CV ECHO MEAS - LVIDS: 2.5 CM
BH CV ECHO MEAS - LVLD AP2: 6.4 CM
BH CV ECHO MEAS - LVLD AP4: 6.4 CM
BH CV ECHO MEAS - LVLS AP2: 5.8 CM
BH CV ECHO MEAS - LVLS AP4: 5.4 CM
BH CV ECHO MEAS - LVOT AREA (M): 3.1 CM^2
BH CV ECHO MEAS - LVOT AREA: 3.1 CM^2
BH CV ECHO MEAS - LVOT DIAM: 2 CM
BH CV ECHO MEAS - LVPWD: 1.1 CM
BH CV ECHO MEAS - MR MAX PG: 27.5 MMHG
BH CV ECHO MEAS - MR MAX VEL: 262 CM/SEC
BH CV ECHO MEAS - MV A MAX VEL: 68.6 CM/SEC
BH CV ECHO MEAS - MV DEC SLOPE: 395 CM/SEC^2
BH CV ECHO MEAS - MV E MAX VEL: 64.7 CM/SEC
BH CV ECHO MEAS - MV E/A: 0.94
BH CV ECHO MEAS - MV MAX PG: 3.5 MMHG
BH CV ECHO MEAS - MV MEAN PG: 1 MMHG
BH CV ECHO MEAS - MV P1/2T MAX VEL: 73.3 CM/SEC
BH CV ECHO MEAS - MV P1/2T: 54.4 MSEC
BH CV ECHO MEAS - MV V2 MAX: 93.8 CM/SEC
BH CV ECHO MEAS - MV V2 MEAN: 50 CM/SEC
BH CV ECHO MEAS - MV V2 VTI: 20.7 CM
BH CV ECHO MEAS - MVA P1/2T LCG: 3 CM^2
BH CV ECHO MEAS - MVA(P1/2T): 4 CM^2
BH CV ECHO MEAS - MVA(VTI): 3.6 CM^2
BH CV ECHO MEAS - PA MAX PG (FULL): 3 MMHG
BH CV ECHO MEAS - PA MAX PG: 4.7 MMHG
BH CV ECHO MEAS - PA MEAN PG (FULL): 1 MMHG
BH CV ECHO MEAS - PA MEAN PG: 2 MMHG
BH CV ECHO MEAS - PA V2 MAX: 108 CM/SEC
BH CV ECHO MEAS - PA V2 MEAN: 73.7 CM/SEC
BH CV ECHO MEAS - PA V2 VTI: 24.4 CM
BH CV ECHO MEAS - PI END-D VEL: 92.3 CM/SEC
BH CV ECHO MEAS - RAP SYSTOLE: 3 MMHG
BH CV ECHO MEAS - RV MAX PG: 1.7 MMHG
BH CV ECHO MEAS - RV MEAN PG: 1 MMHG
BH CV ECHO MEAS - RV V1 MAX: 65.3 CM/SEC
BH CV ECHO MEAS - RV V1 MEAN: 40.8 CM/SEC
BH CV ECHO MEAS - RV V1 VTI: 13.2 CM
BH CV ECHO MEAS - RVSP: 21 MMHG
BH CV ECHO MEAS - SI(AO): 136.9 ML/M^2
BH CV ECHO MEAS - SI(CUBED): 12.7 ML/M^2
BH CV ECHO MEAS - SI(LVOT): 40.9 ML/M^2
BH CV ECHO MEAS - SI(MOD-SP2): 12.3 ML/M^2
BH CV ECHO MEAS - SI(MOD-SP4): 13.5 ML/M^2
BH CV ECHO MEAS - SI(TEICH): 13.5 ML/M^2
BH CV ECHO MEAS - SV(AO): 247 ML
BH CV ECHO MEAS - SV(CUBED): 23 ML
BH CV ECHO MEAS - SV(LVOT): 73.8 ML
BH CV ECHO MEAS - SV(MOD-SP2): 22.2 ML
BH CV ECHO MEAS - SV(MOD-SP4): 24.3 ML
BH CV ECHO MEAS - SV(TEICH): 24.4 ML
BH CV ECHO MEAS - TR MAX VEL: 201 CM/SEC
BH CV VAS BP LEFT ARM: NORMAL MMHG

## 2021-03-30 PROCEDURE — 94010 BREATHING CAPACITY TEST: CPT | Performed by: INTERNAL MEDICINE

## 2021-03-30 PROCEDURE — 94729 DIFFUSING CAPACITY: CPT | Performed by: INTERNAL MEDICINE

## 2021-03-30 PROCEDURE — 94727 GAS DIL/WSHOT DETER LNG VOL: CPT | Performed by: INTERNAL MEDICINE

## 2021-03-30 PROCEDURE — 94618 PULMONARY STRESS TESTING: CPT | Performed by: INTERNAL MEDICINE

## 2021-03-30 NOTE — PROGRESS NOTES
Rocio Sánhcez  Procedure: 6 Minute Walk Test   03/30/2021  Indication:PAUL    Pretest: BP:110/76               HR:74               Sa02:97               Dyspnea:5               Fatigue:4    Post Test: BP:122/80               HR:86               Sa02:94               Dyspnea:9               Fatigue:8    First 6MWT:yes    Supplemental oxygen during test:no    Stopped before 6 minutes:no    Pauses:0    Results in distance walked:246.64 meters    Did individual experience any pain or discomfort:no    I was present for the above test and agree with the data.     [facility] Functional Class III    Adams Haile MD PhD

## 2021-03-30 NOTE — PROGRESS NOTES
Full PFT performed; no post spirometry per order.     Good patient effort and cooperation.     Ordered by Dr. Haile, read by Dr. Pavan Mckenzie.

## 2021-04-08 ENCOUNTER — OFFICE VISIT (OUTPATIENT)
Dept: CARDIOLOGY | Facility: CLINIC | Age: 76
End: 2021-04-08

## 2021-04-08 VITALS
HEIGHT: 63 IN | OXYGEN SATURATION: 98 % | WEIGHT: 172.4 LBS | DIASTOLIC BLOOD PRESSURE: 80 MMHG | BODY MASS INDEX: 30.55 KG/M2 | HEART RATE: 68 BPM | SYSTOLIC BLOOD PRESSURE: 134 MMHG

## 2021-04-08 DIAGNOSIS — E66.09 CLASS 1 OBESITY DUE TO EXCESS CALORIES WITHOUT SERIOUS COMORBIDITY WITH BODY MASS INDEX (BMI) OF 30.0 TO 30.9 IN ADULT: ICD-10-CM

## 2021-04-08 DIAGNOSIS — I36.1 NONRHEUMATIC TRICUSPID VALVE REGURGITATION: Primary | ICD-10-CM

## 2021-04-08 DIAGNOSIS — R06.09 DYSPNEA ON EXERTION: ICD-10-CM

## 2021-04-08 DIAGNOSIS — I35.1 NONRHEUMATIC AORTIC VALVE INSUFFICIENCY: ICD-10-CM

## 2021-04-08 PROCEDURE — 99214 OFFICE O/P EST MOD 30 MIN: CPT | Performed by: INTERNAL MEDICINE

## 2021-04-08 RX ORDER — IPRATROPIUM BROMIDE AND ALBUTEROL SULFATE 2.5; .5 MG/3ML; MG/3ML
SOLUTION RESPIRATORY (INHALATION)
COMMUNITY
End: 2022-11-22

## 2021-04-08 RX ORDER — AMOXICILLIN 250 MG
CAPSULE ORAL
COMMUNITY

## 2021-04-08 RX ORDER — LOPERAMIDE HYDROCHLORIDE AND SIMETHICONE 2; 125 MG/1; MG/1
TABLET ORAL AS NEEDED
COMMUNITY
End: 2022-11-22

## 2021-04-08 RX ORDER — ROPINIROLE 0.25 MG/1
TABLET, FILM COATED ORAL
COMMUNITY
End: 2021-11-23

## 2021-04-08 NOTE — PROGRESS NOTES
"   Pulmonary Arterial Hypertension, Heart Failure & Valvular Heart Disease      Adams Haile M.D., Ph.D., [facility]             History of Present Illness  This is a 76 y.o. female with:    1.  Dyspnea  2. AI  3. TR  4.  Risk factors: Obese, hypertension, dyslipidemia, former smoker, liver transplant    Rocio Sánchez is a 76 y.o. female who returns to clinic today for valvular heart disease.    I last saw the patient in referral for exercise intolerance and exertional dyspnea.  PFTs were normal.  She did have a 2D echocardiogram.  This showed a mild degree of aortic and tricuspid regurgitation.  She has preserved biventricular function.  No pulmonary hypertension.  Stress echo was performed and showed no evidence of inducible ischemia.  She was asked to discuss possible sleep referral with her PCP.  She returns today for results.  She continues with exercise intolerance.  No resting, exertional or nocturnal angina.  She is interested in being entered into surveillance for mild valvular heart disease.      Review of Systems - Review of Systems   Cardiovascular: Positive for dyspnea on exertion. Negative for chest pain, claudication, cyanosis, irregular heartbeat, leg swelling, near-syncope, orthopnea, palpitations, paroxysmal nocturnal dyspnea and syncope.   Respiratory: Positive for shortness of breath. Negative for cough, hemoptysis, snoring, sputum production and wheezing.         All other systems were reviewed and were negative.    family history includes Diabetes in her father; Heart disease in her father; Hypertension in her paternal grandmother.     reports that she has quit smoking. She has never used smokeless tobacco. She reports previous alcohol use. She reports that she does not use drugs.    Allergies   Allergen Reactions   • Cymbalta [Duloxetine Hcl] Provider Review Needed   • Phenergan [Promethazine Hcl] Mental Status Change     \"jittery\" cant be still   • Reglan [Metoclopramide] Provider " Review Needed   • Sulfa Antibiotics GI Intolerance         Current Outpatient Medications:   •  amLODIPine (NORVASC) 10 MG tablet, amlodipine 10 mg tablet  Take 1 tablet daily, Disp: , Rfl:   •  aspirin 81 MG chewable tablet, Chew 81 mg Daily., Disp: , Rfl:   •  atorvastatin (LIPITOR) 20 MG tablet, atorvastatin 20 mg tablet  Take 1 tablet daily, Disp: , Rfl:   •  Calcium Carbonate (CALCIUM 600 PO), Calcium 600  Take 4 tablets per week, Disp: , Rfl:   •  cetirizine (ZYRTEC ALLERGY) 10 MG tablet, Zyrtec 10 mg tablet  Take 1 tablet every day by oral route., Disp: , Rfl:   •  Cholecalciferol (EQL VITAMIN D3) 50 MCG (2000 UT) capsule, D3-2000 50 mcg (2,000 unit) capsule  Take 1 tablet daily, Disp: , Rfl:   •  estradiol (ESTRACE) 0.1 MG/GM vaginal cream, , Disp: , Rfl:   •  fluticasone (FLONASE) 50 MCG/ACT nasal spray, 2 sprays into the nostril(s) as directed by provider Daily., Disp: 16 g, Rfl: 11  •  LORazepam (ATIVAN) 0.5 MG tablet, 0.25 mg Every 12 (Twelve) Hours., Disp: , Rfl:   •  Magnesium Gluconate (MAGNESIUM 27) 500 (27 Mg) MG tablet, magnesium 500 mg tablet  Take 4 tablets weekly, Disp: , Rfl:   •  Melatonin 10 MG capsule, melatonin  10 mg daily, Disp: , Rfl:   •  Omega 3-6-9 Fatty Acids (OMEGA 3-6-9 PO), Adult Multi plus Omega-3, Disp: , Rfl:   •  ondansetron (ZOFRAN) 4 MG tablet, , Disp: , Rfl:   •  pantoprazole (PROTONIX) 40 MG EC tablet, , Disp: , Rfl:   •  Potassium 99 MG tablet, potassium 99 mg tablet  Take 4 tablets per week, Disp: , Rfl:   •  propranolol (INDERAL) 20 MG tablet, Take 20 mg by mouth Daily., Disp: , Rfl:   •  sertraline (ZOLOFT) 100 MG tablet, 100 mg. 1.5 mg daily, Disp: , Rfl:   •  SYNTHROID 50 MCG tablet, Take 50 mcg by mouth Daily., Disp: , Rfl:   •  tacrolimus (PROGRAF) 1 MG capsule, Take 1 mg by mouth. 2 am 2 pm, Disp: , Rfl:   •  Cobalamin Combinations (B-12) 100-5000 MCG sublingual tablet, vitamin B12 1,000 mcg-folic acid 400 mcg sublingual lozenge  Place by sublingual route., Disp:  ", Rfl:   •  famotidine (PEPCID) 20 MG tablet, 20 mg 2 (Two) Times a Day., Disp: , Rfl:   •  fluticasone (FLOVENT HFA) 44 MCG/ACT inhaler, Inhale 2 puffs Daily., Disp: 10.6 g, Rfl: 5  •  hydrOXYzine (ATARAX) 25 MG tablet, hydroxyzine HCl 25 mg tablet  Take 1 to 2 tablets every 6 hours, Disp: , Rfl:   •  ipratropium-albuterol (DUO-NEB) 0.5-2.5 mg/3 ml nebulizer, ipratropium 0.5 mg-albuterol 3 mg (2.5 mg base)/3 mL nebulization soln, Disp: , Rfl:   •  Loperamide-Simethicone 2-125 MG tablet, As Needed., Disp: , Rfl:   •  meclizine (ANTIVERT) 25 MG tablet, Every 8 (Eight) Hours., Disp: , Rfl:   •  rOPINIRole (REQUIP) 0.25 MG tablet, ropinirole 0.25 mg tablet  Take 1 tablet by oral route., Disp: , Rfl:   •  sucralfate (CARAFATE) 1 GM/10ML suspension, Take 10 mL by mouth 2 (Two) Times a Day., Disp: 600 mL, Rfl: 5      Physical Exam:  Vitals:    04/08/21 1014   BP: 134/80   BP Location: Right arm   Patient Position: Sitting   Cuff Size: Adult   Pulse: 68   SpO2: 98%   Weight: 78.2 kg (172 lb 6.4 oz)   Height: 160 cm (63\")   PainSc:   3   PainLoc: Neck     Pulse Pressure: normal  Pulse Ox: Normal  on room air  General: alert, appears stated age and cooperative     Body Habitus: obese    HEENT: Head: Normocephalic, no lesions, without obvious abnormality. No arcus senilis, xanthelasma or xanthomas.  No malar flush, proptosis, xanthomas or malar flush.   Neuro: alert, oriented x3  speech normal in context and clarity  memory intact grossly  Pulses: 2+ and symmetric  JVP: Volume/Pulsation: Normal.  Normal waveforms with a, x, and v waves.   Appropriate inspiratory decrease.  No Kussmaul's. No Yuly's.   Normal x and y descent.    Precordium: Normal impulses. P2 is not palpable.  RV Heave: absent  LV Heave: absent  [place]:  normal size and placement  Palpable S4: absent.  Heart rate: normal    Heart Rhythm: regular     Heart Sounds: S1: normal  S2: normal intensity  S3: absent   S4: absent  Opening Snap: absent    A2-OS:  " no  Pericardial Rub:  Absent: Yes      Ejection click: None      Murmurs:  present    Murmur 1 Type: systolic  Grade: 1/6    Timing: early systolic  Location:   base   Description:ejection  Radiation:  nonradiating  Extremity: moves all extremities equally.       DATA REVIEWED:     EKG. I personally reviewed and interpreted the EKG.    EKG shows sinus mechanism.  Normal intervals.  Normal axis.  No ischemic changes.  No prior EKG.    Results for orders placed in visit on 11/09/20    Adult Stress Echo W/ Cont or Stress Agent if Necessary Per Protocol    Interpretation Summary  · Left ventricular ejection fraction appears to be 56 - 60%. Left ventricular systolic function is normal.  · Target heart rate achieved with dobutamine. Stress ECG without evidence of ischemia.  · Left ventricle behaved normally with stress. Stress LVEF was greater than 75% and hyperdynamic.  · 6    Stress echo shows no evidence of inducible ischemia.    I personally interpreted the 2D TTE.  Preserved biventricular function.  Mild tricuspid and aortic insufficiency.      PFTs, which I personally interpreted, are normal.      --------------------------------------------------------------------------------------------------           The ASCVD Risk score (Omar GILL Jr., et al., 2013) failed to calculate for the following reasons:    Cannot find a previous HDL lab    Cannot find a previous total cholesterol lab     Laboratory evaluations:    No results found for: GLUCOSE, BUN, CREATININE, EGFRIFNONA, EGFRIFAFRI, BCR, K, CO2, CALCIUM, PROTENTOTREF, ALBUMIN, LABIL2, BILIRUBIN, AST, ALT  No results found for: WBC, HGB, HCT, MCV, PLT  No results found for: CHOL, CHLPL, TRIG, HDL, LDL, LDLDIRECT  No results found for: TSH, K3RSFAN, M4VVZSY, THYROIDAB  No results found for: CKTOTAL, CKMB, CKMBINDEX, TROPONINI, TROPONINT  No results found for: HGBA1C  No results found for: DDIMER  No results found for: ALT  No results found for: HGBA1C  No results found  for: GLUF, MICROALBUR, CREATININE  No results found for: IRON, TIBC, FERRITIN  No results found for: INR, PROTIME    Assessment/Plan       1. Nonrheumatic tricuspid valve regurgitation    2. Nonrheumatic aortic valve insufficiency    3. Dyspnea on exertion    4. 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   -Offered clinical surveillance vs. PCP. She will have PCP order a TTE in 3 years.     3.  I continue to suspect this is multifactorial.  She is obese with a BMI of 30.5.  PFTs were normal.  No evidence of congestive heart failure.  No evidence of obstructive coronary artery disease.  I am concerned she may have sleep apnea.  I have asked her to speak with her PCP about this.    4. The BMI is Body mass index is 30.54 kg/m². ACC/AHA guidelines recommend that height, weight and BMI is calculated at visits.  The patient has been provided with information regarding the elevated risk of cardiovascular disease, type 2 diabetes and all-cause mortality.  Sustained weight loss of 3-5% is likely to result in clinically meaningful reductions in triglycerides, hemoglobin A1c, blood glucose, and the risk of developing type 2 diabetes.  The greater the amount of weight loss typically the greater reduction in blood pressure, reduction and need for medications to control blood pressure, improvements in blood glucose and lipids.  Handout was provided on the patient's BMI.  The patient has been encouraged to follow-up with their primary care provider.

## 2021-04-08 NOTE — PATIENT INSTRUCTIONS
Aortic Valve Regurgitation    Aortic valve regurgitation is a condition that happens when the aortic valve does not close all the way. The aortic valve is a gate-like structure between the lower left chamber of the heart (left ventricle) and the main blood vessel that supplies blood to the rest of the body (aorta). The aortic valve opens when the left ventricle squeezes to pump blood into the aorta, and it closes when the left ventricle relaxes.  In aortic valve regurgitation, which may also be called aortic insufficiency, blood in the aorta leaks through the aortic valve after it has closed. This causes the heart to work harder than usual. If aortic valve regurgitation is not treated, it causes enlargement and weakening of the left ventricle. This can result in heart failure, abnormal heart rhythms (arrhythmias), and other dangerous conditions. If this condition develops suddenly, it may need to be treated with emergency surgery.  What are the causes?  This condition may be caused by anything that weakens the aortic valve, such as:  · Severe high blood pressure (hypertension).  · Infection of the inner lining of the heart or the heart valves (endocarditis).  · A ballooning of a weak spot in the aorta wall (aortic aneurysm).  · A tear or separation of the inner walls of the aorta (aortic dissection).  · Injury (trauma) that damages the aortic valve.  · Certain medicines.  · Disease of a protein in the body called collagen (collagen vascular disease).  · A heart problem (bicuspid aortic valve) that is present at birth (congenital).  · An inflammatory condition that can develop after an untreated strep throat infection (rheumatic fever).  · Complications during or after a heart surgery. This is rare.  What are the signs or symptoms?  Symptoms of this condition include:  · Fatigue.  · Shortness of breath.  · Difficulty breathing while lying flat (orthopnea). You may need to sleep on two or more pillows to breathe  better.  · Chest discomfort (angina).  · Head bobbing.  · A fluttering feeling in the chest (palpitations).  · An irregular or faster-than-normal heartbeat.  Symptoms usually develop gradually, unless this condition was caused by a major injury or by endocarditis.  How is this diagnosed?  This condition is diagnosed based on:  · A physical exam.  · An imaging test that uses sound waves to produce images of the heart (echocardiogram).  You may also have other tests to confirm the diagnosis, including:  · Chest X-ray.  · MRI.  · A test that records the electrical impulses of the heart (electrocardiogram, ECG).  · CT angiogram (CTA). In this procedure, a large X-ray machine, called a CT scanner, takes detailed pictures of blood vessels after dye has been injected into the vessels.  · Aortic angiogram. In this procedure, X-ray images are taken after dye has been injected into blood vessels. This tests the function of the aorta.  How is this treated?  Treatment depends on your symptoms, how severe the condition is, and what problems the condition is causing. Treatment may include:  · Observation. If your condition is mild, you may not need treatment. However, you will need to have your condition checked regularly to make sure it is not getting worse or causing serious problems.  · Medicines that help the heart work more efficiently.  · Surgery to repair or replace the valve, in severe cases. Surgery is usually recommended if the left ventricle enlarges beyond a certain point. If aortic valve regurgitation occurs suddenly, surgery may be needed immediately.  Follow these instructions at home:  · Take over-the-counter and prescription medicines only as told by your health care provider.  · Do not use any products that contain nicotine or tobacco, such as cigarettes, e-cigarettes, and chewing tobacco. If you need help quitting, ask your health care provider.  · If directed by your health care provider, avoid heavy weight  lifting and contact sports such as football.  · Follow instructions from your health care provider about eating or drinking restrictions. Your health care provider may recommend that you:  ? Limit alcohol use to:  § 0-1 drink a day for women.  § 0-2 drinks a day for men.  ? Be aware of how much alcohol is in your drink. In the [place] one drink equals one 12 oz bottle of beer (355 mL), one 5 oz glass of wine (148 mL), or one 1½ oz glass of hard liquor (44 mL).  ? Eat foods that are high in fiber, such as fresh fruits and vegetables, whole grains, and beans.  ? Eat less salt (sodium) and salty foods. Check ingredients and nutrition facts on packaged foods and beverages.  · Keep all follow-up visits as told by your health care provider. This is important. You may need regular tests to monitor your condition and check how well your heart is pumping blood.  Contact a health care provider if:  · Your angina symptoms are more frequent or seem to be getting worse.  · Your breathing problems seem to be getting worse.  · You feel dizzy or close to fainting.  · You have swelling in your feet, ankles, legs, or abdomen.  · You urinate more than usual during the night (nocturia).  · You have an unexplained fever that lasts 2 days or longer.  · You develop new symptoms.  Get help right away if you:  · Have severe chest pain.  · Have severe shortness of breath.  · Feel rapid or irregular heartbeats.  · Feel light-headed or you faint.  · Have sudden, unexplained weight gain.  Summary  · Aortic valve regurgitation is a condition in which the aortic valve does not close all the way. This causes the heart to work harder than usual.  · This condition may be treated with observation, medicines, or surgery.  · Take over-the-counter and prescription medicines only as told by your health care provider.  · Eat less salt (sodium) and salty foods. Check ingredients and nutrition facts on packaged foods and beverages.  · Get help right away if  you have severe chest pain, shortness of breath, irregular heartbeats, sudden weight gain, or if you feel light-headed or you faint.  This information is not intended to replace advice given to you by your health care provider. Make sure you discuss any questions you have with your health care provider.  Document Revised: 09/23/2020 Document Reviewed: 09/10/2019  [employer] Patient Education © 2021 [employer]

## 2021-11-23 ENCOUNTER — OFFICE VISIT (OUTPATIENT)
Dept: CARDIOLOGY | Facility: CLINIC | Age: 76
End: 2021-11-23

## 2021-11-23 VITALS
WEIGHT: 176.6 LBS | DIASTOLIC BLOOD PRESSURE: 70 MMHG | TEMPERATURE: 97.7 F | SYSTOLIC BLOOD PRESSURE: 130 MMHG | BODY MASS INDEX: 31.28 KG/M2 | OXYGEN SATURATION: 94 % | HEART RATE: 68 BPM

## 2021-11-23 DIAGNOSIS — E78.2 MIXED HYPERLIPIDEMIA: ICD-10-CM

## 2021-11-23 DIAGNOSIS — R00.2 PALPITATIONS: ICD-10-CM

## 2021-11-23 DIAGNOSIS — I10 ESSENTIAL HYPERTENSION: Primary | ICD-10-CM

## 2021-11-23 PROCEDURE — 93000 ELECTROCARDIOGRAM COMPLETE: CPT | Performed by: INTERNAL MEDICINE

## 2021-11-23 PROCEDURE — 99213 OFFICE O/P EST LOW 20 MIN: CPT | Performed by: INTERNAL MEDICINE

## 2021-11-23 NOTE — PROGRESS NOTES
Rocio Sánchez  76 y.o. female    11/23/2021  1. Essential hypertension    2. Mixed hyperlipidemia    3. Palpitations        History of Present Illness:    Body mass index is 31.28 kg/m². BMI is above normal parameters. Recommendations include: exercise counseling, nutrition counseling and referral to primary care.    76 years old patient previously evaluated with Dr. Haile for exertional dyspnea multifactorial etiology and palpitation with a documented normal burden of premature atrial supraventricular tachycardia beat.  The patient with normal PFT preserved biventricular function and normal stress echocardiogram.  No orthopnea no PND no chest pain reported.  No intermittent claudication dysuria hematuria syncope or lightheaded dizziness reported.  She is a pleased with the clinical outcome.     Echo March 2021    · Left ventricular systolic function is low normal. LVEF is 55%. Mild concentric hypertrophy with grade 1 diastolic function.  · Right ventricular systolic function is borderline low. RV cavity is mildly dilated.  · Saline test results are negative.  · Thickening in the aortic valve with mild aortic regurgitation.  · Mild tricuspid regurgitation with normal pulmonary pressures.    Stress echo January 2021  · Left ventricular ejection fraction appears to be 56 - 60%. Left ventricular systolic function is normal.  · Target heart rate achieved with dobutamine. Stress ECG without evidence of ischemia.  · Left ventricle behaved normally with stress. Stress LVEF was greater than 75% and hyperdynamic.    Monitor November 2020      · A normal monitor study.  · Predominant rhythm sinus mechanism with a normal average heart rate of 82 bpm. Heart rate varied from 59 to a maximum 132 bpm.  · Patient submitted to symptoms which correlated with sinus mechanism only.  · Normal conduction. No SVT.  · Normal burden of PACs and PVCs.          SUBJECTIVE:    Allergies   Allergen Reactions   • Cymbalta [Duloxetine  "Hcl] Provider Review Needed   • Phenergan [Promethazine Hcl] Mental Status Change     \"jittery\" cant be still   • Reglan [Metoclopramide] Provider Review Needed   • Sulfa Antibiotics GI Intolerance         Past Medical History:   Diagnosis Date   • Anxiety    • Cerebrovascular accident (HCC)    • Chronic kidney disease (CKD), stage III (moderate) (HCC)    • Disease of thyroid gland    • GERD (gastroesophageal reflux disease)    • High blood pressure    • Hyperlipidemia    • Hypertension    • Hypothyroidism    • Liver transplant recipient (HCC)    • Sinusitis          Past Surgical History:   Procedure Laterality Date   • CHOLECYSTECTOMY     • HYSTERECTOMY     • LIVER TRANSPLANTATION     • TONSILLECTOMY           Family History   Problem Relation Age of Onset   • Diabetes Father    • Heart disease Father    • Hypertension Paternal Grandmother          Social History     Socioeconomic History   • Marital status:    Tobacco Use   • Smoking status: Former Smoker   • Smokeless tobacco: Never Used   Substance and Sexual Activity   • Alcohol use: Not Currently   • Drug use: Never   • Sexual activity: Defer         Current Outpatient Medications   Medication Sig Dispense Refill   • amLODIPine (NORVASC) 10 MG tablet amlodipine 10 mg tablet   Take 1 tablet daily     • aspirin 81 MG chewable tablet Chew 81 mg Daily.     • atorvastatin (LIPITOR) 20 MG tablet atorvastatin 20 mg tablet   Take 1 tablet daily     • Calcium Carbonate (CALCIUM 600 PO) Calcium 600   Take 4 tablets per week     • cetirizine (ZYRTEC ALLERGY) 10 MG tablet Zyrtec 10 mg tablet   Take 1 tablet every day by oral route.     • Cholecalciferol (EQL VITAMIN D3) 50 MCG (2000 UT) capsule D3-2000 50 mcg (2,000 unit) capsule   Take 1 tablet daily     • Cobalamin Combinations (B-12) 100-5000 MCG sublingual tablet vitamin B12 1,000 mcg-folic acid 400 mcg sublingual lozenge   Place by sublingual route.     • estradiol (ESTRACE) 0.1 MG/GM vaginal cream      • " famotidine (PEPCID) 20 MG tablet 20 mg 2 (Two) Times a Day.     • fluticasone (FLONASE) 50 MCG/ACT nasal spray 2 sprays into the nostril(s) as directed by provider Daily. 16 g 11   • fluticasone (FLOVENT HFA) 44 MCG/ACT inhaler Inhale 2 puffs Daily. 10.6 g 5   • hydrOXYzine (ATARAX) 25 MG tablet hydroxyzine HCl 25 mg tablet   Take 1 to 2 tablets every 6 hours     • ipratropium-albuterol (DUO-NEB) 0.5-2.5 mg/3 ml nebulizer ipratropium 0.5 mg-albuterol 3 mg (2.5 mg base)/3 mL nebulization soln     • Loperamide-Simethicone 2-125 MG tablet As Needed.     • LORazepam (ATIVAN) 0.5 MG tablet 0.25 mg Every 12 (Twelve) Hours.     • Magnesium Gluconate (MAGNESIUM 27) 500 (27 Mg) MG tablet magnesium 500 mg tablet   Take 4 tablets weekly     • meclizine (ANTIVERT) 25 MG tablet Every 8 (Eight) Hours.     • Melatonin 10 MG capsule melatonin   10 mg daily     • Omega 3-6-9 Fatty Acids (OMEGA 3-6-9 PO) Adult Multi plus Omega-3     • ondansetron (ZOFRAN) 4 MG tablet      • Potassium 99 MG tablet potassium 99 mg tablet   Take 4 tablets per week     • propranolol (INDERAL) 20 MG tablet Take 20 mg by mouth Daily.     • sertraline (ZOLOFT) 100 MG tablet 100 mg. 1.5 mg daily     • SYNTHROID 50 MCG tablet Take 50 mcg by mouth Daily.     • tacrolimus (PROGRAF) 1 MG capsule Take 1 mg by mouth. 2 am 2 pm     • pantoprazole (PROTONIX) 40 MG EC tablet      • rOPINIRole (REQUIP) 0.25 MG tablet ropinirole 0.25 mg tablet   Take 1 tablet by oral route.     • sucralfate (CARAFATE) 1 GM/10ML suspension Take 10 mL by mouth 2 (Two) Times a Day. 600 mL 5     No current facility-administered medications for this visit.           Review of Systems:     Constitutional:  Denies recent weight loss, weight gain,no change in exercise tolerance.     HENT:  Denies any hearing loss, epistaxis    Eyes: No blurring    Respiratory: History of exertional dyspnea multifactorial 12    Cardiovascular: See H&P    Gastrointestinal:  Denies change in bowel habits and  dyspepsia    Endocrine: Negative for cold intolerance, heat intolerance, polydipsia    Genitourinary: Negative.      Musculoskeletal: History of osteoarthritis    Skin:  Deniesrashes, or skin lesions.     Allergic/Immunologic: Negative.  Negative for environmental allergies    Neurological:  Denies any history of recurrent headaches, strokes,     Hematological: Denies any food allergies, seasonal allergies    Psychiatric/Behavioral: Denies any history of depression        OBJECTIVE:    /70 (BP Location: Left arm, Patient Position: Sitting, Cuff Size: Adult)   Pulse 68   Temp 97.7 °F (36.5 °C)   Wt 80.1 kg (176 lb 9.6 oz)   SpO2 94%   BMI 31.28 kg/m²     Physical Exam:     Constitutional: Cooperative, alert and oriented, well-developed, well-nourished, in no acute distress.     HENT:   Head: Normocephalic, conjunctive is a pink, thyroid is nonpalpable no carotid bruit and trachea central.     Cardiovascular: Regular rhythm, S1 and S2 normal, no S3 or S4. Apical impulse not displaced. No murmurs    Pulmonary/Chest: Chest: No chest wall tenderness no rales and wheezing    Abdominal: Abdomen soft, bowel sounds normoactive, no masses,    Musculoskeletal: No deformities, clubbing, cyanosis, erythema.   Neurological: No gross motor or sensory deficits noted    Skin: Warm and dry to the touch, no apparent skin lesions .     Psychiatric: He has a normal mood and affect. His behavior is normal        Procedures      No results found for: WBC, HGB, HCT, MCV, PLT  No results found for: GLUCOSE, BUN, CREATININE, EGFRIFNONA, EGFRIFAFRI, BCR, CO2, CALCIUM, PROTENTOTREF, ALBUMIN, LABIL2, BILIRUBIN, AST, ALT  No results found for: CHOL  No results found for: TRIG  No results found for: HDL  No components found for: LDLCALC  No results found for: LDL  No results found for: HDLLDLRATIO  No components found for: CHOLHDL  No results found for: HGBA1C  No results found for: TSH, C7BTKKR, P9ZRNLS, THYROIDAB        ASSESSMENT  AND PLAN:  1. Nonrheumatic tricuspid valve regurgitation    2. Nonrheumatic aortic valve insufficiency    3. Dyspnea on exertion    4. Class 1 obesity due to excess calories without serious comorbidity with body mass index (BMI) of 30.0 to 30.9 in adult    #1 hypertension    Good blood pressure control patient was counseled educated regarding risk factor lifestyle modification.  She will continue amlodipine and Inderal    #2 palpitation with normal bradycardia with premature atrial and ventricular complex    No further records reported.  She is a pleased with her clinical outcome.  We will continue Inderal      Number mild mitral tricuspid regurgitation     1/2. ACC stage B.   -Offered clinical surveillance vs. PCP.  Will follow with the periodic echo.     Preventive    Obesity with a BMI of 31.and history of hypertension hyperlipidemia    Significant low carbohydrate, low-fat, DASH diet graded exercise discussed.    Diagnoses and all orders for this visit:    1. Essential hypertension (Primary)  -     ECG 12 Lead    2. Mixed hyperlipidemia    3. Palpitations          Ricardo Rader MD  11/23/2021  14:43 CST

## 2021-11-30 LAB
QT INTERVAL: 412 MS
QTC INTERVAL: 438 MS

## 2022-11-02 ENCOUNTER — TRANSCRIBE ORDERS (OUTPATIENT)
Dept: LAB | Facility: HOSPITAL | Age: 77
End: 2022-11-02

## 2022-11-02 DIAGNOSIS — Z94.4 LIVER REPLACED BY TRANSPLANT: Primary | ICD-10-CM

## 2022-11-03 ENCOUNTER — LAB (OUTPATIENT)
Dept: LAB | Facility: HOSPITAL | Age: 77
End: 2022-11-03

## 2022-11-03 DIAGNOSIS — Z94.4 LIVER REPLACED BY TRANSPLANT: ICD-10-CM

## 2022-11-03 LAB
ALBUMIN SERPL-MCNC: 4.3 G/DL (ref 3.5–5.2)
ALBUMIN/GLOB SERPL: 1.5 G/DL
ALP SERPL-CCNC: 70 U/L (ref 39–117)
ALT SERPL W P-5'-P-CCNC: 30 U/L (ref 1–33)
ANION GAP SERPL CALCULATED.3IONS-SCNC: 13.1 MMOL/L (ref 5–15)
AST SERPL-CCNC: 26 U/L (ref 1–32)
BASOPHILS # BLD AUTO: 0.06 10*3/MM3 (ref 0–0.2)
BASOPHILS NFR BLD AUTO: 0.9 % (ref 0–1.5)
BILIRUB SERPL-MCNC: 0.4 MG/DL (ref 0–1.2)
BUN SERPL-MCNC: 30 MG/DL (ref 8–23)
BUN/CREAT SERPL: 24.6 (ref 7–25)
CALCIUM SPEC-SCNC: 9.6 MG/DL (ref 8.6–10.5)
CHLORIDE SERPL-SCNC: 103 MMOL/L (ref 98–107)
CO2 SERPL-SCNC: 23.9 MMOL/L (ref 22–29)
CREAT SERPL-MCNC: 1.22 MG/DL (ref 0.57–1)
DEPRECATED RDW RBC AUTO: 44.3 FL (ref 37–54)
EGFRCR SERPLBLD CKD-EPI 2021: 46.4 ML/MIN/1.73
EOSINOPHIL # BLD AUTO: 0.43 10*3/MM3 (ref 0–0.4)
EOSINOPHIL NFR BLD AUTO: 6.3 % (ref 0.3–6.2)
ERYTHROCYTE [DISTWIDTH] IN BLOOD BY AUTOMATED COUNT: 14.1 % (ref 12.3–15.4)
GLOBULIN UR ELPH-MCNC: 2.8 GM/DL
GLUCOSE SERPL-MCNC: 94 MG/DL (ref 65–99)
HCT VFR BLD AUTO: 38.6 % (ref 34–46.6)
HGB BLD-MCNC: 13.6 G/DL (ref 12–15.9)
IMM GRANULOCYTES # BLD AUTO: 0.02 10*3/MM3 (ref 0–0.05)
IMM GRANULOCYTES NFR BLD AUTO: 0.3 % (ref 0–0.5)
LYMPHOCYTES # BLD AUTO: 1.48 10*3/MM3 (ref 0.7–3.1)
LYMPHOCYTES NFR BLD AUTO: 21.7 % (ref 19.6–45.3)
MCH RBC QN AUTO: 30.2 PG (ref 26.6–33)
MCHC RBC AUTO-ENTMCNC: 35.2 G/DL (ref 31.5–35.7)
MCV RBC AUTO: 85.8 FL (ref 79–97)
MONOCYTES # BLD AUTO: 0.6 10*3/MM3 (ref 0.1–0.9)
MONOCYTES NFR BLD AUTO: 8.8 % (ref 5–12)
NEUTROPHILS NFR BLD AUTO: 4.24 10*3/MM3 (ref 1.7–7)
NEUTROPHILS NFR BLD AUTO: 62 % (ref 42.7–76)
NRBC BLD AUTO-RTO: 0 /100 WBC (ref 0–0.2)
PLATELET # BLD AUTO: 190 10*3/MM3 (ref 140–450)
PMV BLD AUTO: 11.1 FL (ref 6–12)
POTASSIUM SERPL-SCNC: 4.7 MMOL/L (ref 3.5–5.2)
PROT SERPL-MCNC: 7.1 G/DL (ref 6–8.5)
RBC # BLD AUTO: 4.5 10*6/MM3 (ref 3.77–5.28)
SODIUM SERPL-SCNC: 140 MMOL/L (ref 136–145)
WBC NRBC COR # BLD: 6.83 10*3/MM3 (ref 3.4–10.8)

## 2022-11-03 PROCEDURE — 36415 COLL VENOUS BLD VENIPUNCTURE: CPT

## 2022-11-03 PROCEDURE — 80197 ASSAY OF TACROLIMUS: CPT | Performed by: NURSE PRACTITIONER

## 2022-11-03 PROCEDURE — 85025 COMPLETE CBC W/AUTO DIFF WBC: CPT | Performed by: NURSE PRACTITIONER

## 2022-11-03 PROCEDURE — 80053 COMPREHEN METABOLIC PANEL: CPT | Performed by: NURSE PRACTITIONER

## 2022-11-06 LAB — TACROLIMUS BLD LC/MS/MS-MCNC: 3.9 NG/ML (ref 2–20)

## 2022-11-22 ENCOUNTER — OFFICE VISIT (OUTPATIENT)
Dept: CARDIOLOGY | Facility: CLINIC | Age: 77
End: 2022-11-22

## 2022-11-22 VITALS
SYSTOLIC BLOOD PRESSURE: 130 MMHG | DIASTOLIC BLOOD PRESSURE: 84 MMHG | BODY MASS INDEX: 31.54 KG/M2 | WEIGHT: 178 LBS | HEART RATE: 73 BPM | HEIGHT: 63 IN | OXYGEN SATURATION: 94 %

## 2022-11-22 DIAGNOSIS — E78.2 MIXED HYPERLIPIDEMIA: ICD-10-CM

## 2022-11-22 DIAGNOSIS — I35.1 NONRHEUMATIC AORTIC VALVE INSUFFICIENCY: ICD-10-CM

## 2022-11-22 DIAGNOSIS — R00.2 PALPITATIONS: ICD-10-CM

## 2022-11-22 DIAGNOSIS — I10 ESSENTIAL HYPERTENSION: Primary | ICD-10-CM

## 2022-11-22 PROCEDURE — 93000 ELECTROCARDIOGRAM COMPLETE: CPT | Performed by: INTERNAL MEDICINE

## 2022-11-22 PROCEDURE — 99214 OFFICE O/P EST MOD 30 MIN: CPT | Performed by: INTERNAL MEDICINE

## 2022-11-22 NOTE — PROGRESS NOTES
Rocio Sánchez  77 y.o. female    11/22/2022  1. Essential hypertension    2. Mixed hyperlipidemia    3. Palpitations    4. Nonrheumatic aortic valve insufficiency        History of Present Illness:    There is no height or weight on file to calculate BMI. BMI is above normal parameters. Recommendations include: exercise counseling, nutrition counseling and referral to primary care.    77 years old patient presented today dated 11/22/2020 for routine follow-up longstanding history of postnasal dripping sometimes making it difficult for her to sleep and no symptom of cardiac decompensation such orthopnea PND chest pain lightheaded dizziness reported.  Patient have exertional dyspnea multifactorial Trelegy and documented normal burden of premature supraventricular beats with a nonsustained tachycardia.  normal PFT with preserved left ventricle systolic function and previous normal stress test.  Last echocardiogram in March 2021 mild aortic and tricuspid regurgitations with normal right ventricle systolic pressure.       Echo March 2021    · Left ventricular systolic function is low normal. LVEF is 55%. Mild concentric hypertrophy with grade 1 diastolic function.  · Right ventricular systolic function is borderline low. RV cavity is mildly dilated.  · Saline test results are negative.  · Thickening in the aortic valve with mild aortic regurgitation.  · Mild tricuspid regurgitation with normal pulmonary pressures.    Stress echo January 2021  · Left ventricular ejection fraction appears to be 56 - 60%. Left ventricular systolic function is normal.  · Target heart rate achieved with dobutamine. Stress ECG without evidence of ischemia.  · Left ventricle behaved normally with stress. Stress LVEF was greater than 75% and hyperdynamic.    Monitor November 2020      · A normal monitor study.  · Predominant rhythm sinus mechanism with a normal average heart rate of 82 bpm. Heart rate varied from 59 to a maximum 132  "bpm.  · Patient submitted to symptoms which correlated with sinus mechanism only.  · Normal conduction. No SVT.  · Normal burden of PACs and PVCs.          SUBJECTIVE:    Allergies   Allergen Reactions   • Cymbalta [Duloxetine Hcl] Provider Review Needed   • Phenergan [Promethazine Hcl] Mental Status Change     \"jittery\" cant be still   • Reglan [Metoclopramide] Provider Review Needed   • Sulfa Antibiotics GI Intolerance         Past Medical History:   Diagnosis Date   • Anxiety    • Cerebrovascular accident (HCC)    • Chronic kidney disease (CKD), stage III (moderate) (HCC)    • Disease of thyroid gland    • GERD (gastroesophageal reflux disease)    • High blood pressure    • Hyperlipidemia    • Hypertension    • Hypothyroidism    • Liver transplant recipient (HCC)    • Sinusitis          Past Surgical History:   Procedure Laterality Date   • CHOLECYSTECTOMY     • HYSTERECTOMY     • LIVER TRANSPLANTATION     • TONSILLECTOMY           Family History   Problem Relation Age of Onset   • Diabetes Father    • Heart disease Father    • Hypertension Paternal Grandmother          Social History     Socioeconomic History   • Marital status:    Tobacco Use   • Smoking status: Former   • Smokeless tobacco: Never   Substance and Sexual Activity   • Alcohol use: Not Currently   • Drug use: Never   • Sexual activity: Defer         Current Outpatient Medications   Medication Sig Dispense Refill   • amLODIPine (NORVASC) 10 MG tablet amlodipine 10 mg tablet   Take 1 tablet daily     • aspirin 81 MG chewable tablet Chew 81 mg Daily.     • atorvastatin (LIPITOR) 20 MG tablet atorvastatin 20 mg tablet   Take 1 tablet daily     • Calcium Carbonate (CALCIUM 600 PO) Calcium 600   Take 4 tablets per week     • cetirizine (ZYRTEC ALLERGY) 10 MG tablet Zyrtec 10 mg tablet   Take 1 tablet every day by oral route.     • Cholecalciferol (EQL VITAMIN D3) 50 MCG (2000 UT) capsule D3-2000 50 mcg (2,000 unit) capsule   Take 1 tablet daily   "   • Cobalamin Combinations (B-12) 100-5000 MCG sublingual tablet vitamin B12 1,000 mcg-folic acid 400 mcg sublingual lozenge   Place by sublingual route.     • estradiol (ESTRACE) 0.1 MG/GM vaginal cream      • famotidine (PEPCID) 20 MG tablet 20 mg 2 (Two) Times a Day.     • fluticasone (FLONASE) 50 MCG/ACT nasal spray 2 sprays into the nostril(s) as directed by provider Daily. 16 g 11   • fluticasone (FLOVENT HFA) 44 MCG/ACT inhaler Inhale 2 puffs Daily. 10.6 g 5   • hydrOXYzine (ATARAX) 25 MG tablet hydroxyzine HCl 25 mg tablet   Take 1 to 2 tablets every 6 hours     • ipratropium-albuterol (DUO-NEB) 0.5-2.5 mg/3 ml nebulizer ipratropium 0.5 mg-albuterol 3 mg (2.5 mg base)/3 mL nebulization soln     • Loperamide-Simethicone 2-125 MG tablet As Needed.     • LORazepam (ATIVAN) 0.5 MG tablet 0.25 mg Every 12 (Twelve) Hours.     • Magnesium Gluconate (MAGNESIUM 27) 500 (27 Mg) MG tablet magnesium 500 mg tablet   Take 4 tablets weekly     • meclizine (ANTIVERT) 25 MG tablet Every 8 (Eight) Hours.     • Melatonin 10 MG capsule melatonin   10 mg daily     • Omega 3-6-9 Fatty Acids (OMEGA 3-6-9 PO) Adult Multi plus Omega-3     • ondansetron (ZOFRAN) 4 MG tablet      • Potassium 99 MG tablet potassium 99 mg tablet   Take 4 tablets per week     • propranolol (INDERAL) 20 MG tablet Take 20 mg by mouth Daily.     • sertraline (ZOLOFT) 100 MG tablet 100 mg. 1.5 mg daily     • SYNTHROID 50 MCG tablet Take 50 mcg by mouth Daily.     • tacrolimus (PROGRAF) 1 MG capsule Take 1 mg by mouth. 2 am 2 pm       No current facility-administered medications for this visit.           Review of Systems:   No significant change was noted in the review of system dated 11/22/2022  Constitutional:  Denies recent weight loss, weight gain,no change in exercise tolerance.     HENT:  Denies any hearing loss, epistaxis    Eyes: No blurring    Respiratory: History of exertional dyspnea multifactorial    Cardiovascular: See H&P    Gastrointestinal:   Denies change in bowel habits and dyspepsia    Endocrine: Negative for cold intolerance, heat intolerance, polydipsia    Genitourinary: Negative.      Musculoskeletal: History of osteoarthritis    Skin:  Deniesrashes, or skin lesions.     Allergic/Immunologic: Negative.  Negative for environmental allergies    Neurological:  Denies any history of recurrent headaches, strokes,     Hematological: Denies any food allergies, seasonal allergies    Psychiatric/Behavioral: Denies any history of depression        OBJECTIVE:    There were no vitals taken for this visit.    Physical Exam:   No change was noted in physical  Constitutional: Cooperative, alert and oriented, well-developed, well-nourished, in no acute distress.     HENT:   Head: Normocephalic, conjunctive is a pink, thyroid is nonpalpable no carotid bruit and trachea central.     Cardiovascular: Regular rhythm, S1 and S2 normal, no S3 or S4. Apical impulse not displaced. No murmurs    Pulmonary/Chest: Chest: No chest wall tenderness no rales and wheezing    Abdominal: Abdomen soft, bowel sounds normoactive, no masses,    Musculoskeletal: No deformities, clubbing, cyanosis, erythema.   Neurological: No gross motor or sensory deficits noted    Skin: Warm and dry to the touch, no apparent skin lesions .     Psychiatric: He has a normal mood and affect. His behavior is normal        Procedures      Lab Results   Component Value Date    WBC 6.83 11/03/2022    HGB 13.6 11/03/2022    HCT 38.6 11/03/2022    MCV 85.8 11/03/2022     11/03/2022     Lab Results   Component Value Date    GLUCOSE 94 11/03/2022    BUN 30 (H) 11/03/2022    CREATININE 1.22 (H) 11/03/2022    BCR 24.6 11/03/2022    CO2 23.9 11/03/2022    CALCIUM 9.6 11/03/2022    ALBUMIN 4.30 11/03/2022    AST 26 11/03/2022    ALT 30 11/03/2022     No results found for: CHOL  No results found for: TRIG  No results found for: HDL  No components found for: LDLCALC  No results found for: LDL  No results found  for: HDLLDLRATIO  No components found for: CHOLHDL  No results found for: HGBA1C  No results found for: TSH, [id number], [id number], THYROIDAB        ASSESSMENT AND PLAN:  1. Nonrheumatic tricuspid valve regurgitation    2. Nonrheumatic aortic valve insufficiency stage B we will continue clinical surveillance   3. Dyspnea on exertion    4. Class 1 obesity due to excess calories without serious comorbidity with body mass index (BMI) of 31 to 31.5 in adult    5     hypertension    Good blood pressure control patient was counseled educated regarding risk factor lifestyle modification.  She will continue amlodipine and Inderal    #2 palpitation with within normal burden of premature atrial ventricular complex    No further recurrence.  The patient is a pleased with clinical outcome.  We will continue Inderal    Hyperlipidemia    Continues statin    Postnasal dripping  Longstanding history of postnasal dripping making with difficult may be the underlying reason for her shortness of breath with activity.  I spent quite a bit of time in educating the patient and Nuti pot was recommended         Preventive    Class I obesity with a BMI 31.5 obesity with  history of hypertension hyperlipidemia    Significant low carbohydrate, low-fat, DASH diet graded exercise discussed.              This document has been electronically signed by Ricardo Rader MD on November 22, 2022 13:38 CST        Diagnoses and all orders for this visit:    1. Essential hypertension (Primary)  -     ECG 12 Lead    2. Mixed hyperlipidemia    3. Palpitations    4. Nonrheumatic aortic valve insufficiency          Ricardo Rader MD  11/22/2022  13:34 CST

## 2022-11-30 LAB
QT INTERVAL: 390 MS
QTC INTERVAL: 429 MS

## 2023-03-02 ENCOUNTER — LAB (OUTPATIENT)
Dept: LAB | Facility: HOSPITAL | Age: 78
End: 2023-03-02
Payer: MEDICARE

## 2023-03-02 ENCOUNTER — TRANSCRIBE ORDERS (OUTPATIENT)
Dept: LAB | Facility: HOSPITAL | Age: 78
End: 2023-03-02
Payer: MEDICARE

## 2023-03-02 DIAGNOSIS — Z94.4 LIVER REPLACED BY TRANSPLANT: Primary | ICD-10-CM

## 2023-03-02 DIAGNOSIS — Z94.4 LIVER REPLACED BY TRANSPLANT: ICD-10-CM

## 2023-03-02 LAB
ALBUMIN SERPL-MCNC: 4.6 G/DL (ref 3.5–5.2)
ALBUMIN/GLOB SERPL: 1.7 G/DL
ALP SERPL-CCNC: 65 U/L (ref 39–117)
ALT SERPL W P-5'-P-CCNC: 19 U/L (ref 1–33)
ANION GAP SERPL CALCULATED.3IONS-SCNC: 14 MMOL/L (ref 5–15)
AST SERPL-CCNC: 17 U/L (ref 1–32)
BASOPHILS # BLD AUTO: 0.06 10*3/MM3 (ref 0–0.2)
BASOPHILS NFR BLD AUTO: 0.7 % (ref 0–1.5)
BILIRUB SERPL-MCNC: 0.4 MG/DL (ref 0–1.2)
BUN SERPL-MCNC: 34 MG/DL (ref 8–23)
BUN/CREAT SERPL: 27 (ref 7–25)
CALCIUM SPEC-SCNC: 9.5 MG/DL (ref 8.6–10.5)
CHLORIDE SERPL-SCNC: 106 MMOL/L (ref 98–107)
CO2 SERPL-SCNC: 19 MMOL/L (ref 22–29)
CREAT SERPL-MCNC: 1.26 MG/DL (ref 0.57–1)
DEPRECATED RDW RBC AUTO: 44.3 FL (ref 37–54)
EGFRCR SERPLBLD CKD-EPI 2021: 44.6 ML/MIN/1.73
EOSINOPHIL # BLD AUTO: 0.45 10*3/MM3 (ref 0–0.4)
EOSINOPHIL NFR BLD AUTO: 5.6 % (ref 0.3–6.2)
ERYTHROCYTE [DISTWIDTH] IN BLOOD BY AUTOMATED COUNT: 13.9 % (ref 12.3–15.4)
GLOBULIN UR ELPH-MCNC: 2.7 GM/DL
GLUCOSE SERPL-MCNC: 107 MG/DL (ref 65–99)
HCT VFR BLD AUTO: 40.3 % (ref 34–46.6)
HGB BLD-MCNC: 13.9 G/DL (ref 12–15.9)
IMM GRANULOCYTES # BLD AUTO: 0.03 10*3/MM3 (ref 0–0.05)
IMM GRANULOCYTES NFR BLD AUTO: 0.4 % (ref 0–0.5)
LYMPHOCYTES # BLD AUTO: 1.81 10*3/MM3 (ref 0.7–3.1)
LYMPHOCYTES NFR BLD AUTO: 22.5 % (ref 19.6–45.3)
MCH RBC QN AUTO: 30.1 PG (ref 26.6–33)
MCHC RBC AUTO-ENTMCNC: 34.5 G/DL (ref 31.5–35.7)
MCV RBC AUTO: 87.2 FL (ref 79–97)
MONOCYTES # BLD AUTO: 0.65 10*3/MM3 (ref 0.1–0.9)
MONOCYTES NFR BLD AUTO: 8.1 % (ref 5–12)
NEUTROPHILS NFR BLD AUTO: 5.05 10*3/MM3 (ref 1.7–7)
NEUTROPHILS NFR BLD AUTO: 62.7 % (ref 42.7–76)
NRBC BLD AUTO-RTO: 0 /100 WBC (ref 0–0.2)
PLATELET # BLD AUTO: 200 10*3/MM3 (ref 140–450)
PMV BLD AUTO: 10.8 FL (ref 6–12)
POTASSIUM SERPL-SCNC: 4.6 MMOL/L (ref 3.5–5.2)
PROT SERPL-MCNC: 7.3 G/DL (ref 6–8.5)
RBC # BLD AUTO: 4.62 10*6/MM3 (ref 3.77–5.28)
SODIUM SERPL-SCNC: 139 MMOL/L (ref 136–145)
WBC NRBC COR # BLD: 8.05 10*3/MM3 (ref 3.4–10.8)

## 2023-03-02 PROCEDURE — 80197 ASSAY OF TACROLIMUS: CPT

## 2023-03-02 PROCEDURE — 36415 COLL VENOUS BLD VENIPUNCTURE: CPT

## 2023-03-02 PROCEDURE — 85025 COMPLETE CBC W/AUTO DIFF WBC: CPT

## 2023-03-02 PROCEDURE — 80053 COMPREHEN METABOLIC PANEL: CPT

## 2023-03-05 LAB — TACROLIMUS BLD LC/MS/MS-MCNC: 3.1 NG/ML (ref 2–20)

## 2023-03-21 ENCOUNTER — TRANSCRIBE ORDERS (OUTPATIENT)
Dept: LAB | Facility: HOSPITAL | Age: 78
End: 2023-03-21
Payer: MEDICARE

## 2023-03-21 ENCOUNTER — LAB (OUTPATIENT)
Dept: LAB | Facility: HOSPITAL | Age: 78
End: 2023-03-21
Payer: MEDICARE

## 2023-03-21 DIAGNOSIS — Z94.4 LIVER REPLACED BY TRANSPLANT: ICD-10-CM

## 2023-03-21 DIAGNOSIS — Z94.4 LIVER REPLACED BY TRANSPLANT: Primary | ICD-10-CM

## 2023-03-21 PROCEDURE — 87799 DETECT AGENT NOS DNA QUANT: CPT

## 2023-03-25 LAB
CMV DNA SERPL NAA+PROBE-ACNC: NEGATIVE IU/ML
CMV DNA SERPL NAA+PROBE-LOG IU: NORMAL LOG10 IU/ML
EBV DNA # BLD NAA+PROBE: NEGATIVE COPIES/ML
EBV DNA SPEC NAA+PROBE-LOG#: NORMAL LOG10COPY/ML

## 2023-03-30 ENCOUNTER — LAB (OUTPATIENT)
Dept: LAB | Facility: HOSPITAL | Age: 78
End: 2023-03-30
Payer: MEDICARE

## 2023-03-30 ENCOUNTER — TRANSCRIBE ORDERS (OUTPATIENT)
Dept: LAB | Facility: HOSPITAL | Age: 78
End: 2023-03-30
Payer: MEDICARE

## 2023-03-30 DIAGNOSIS — R53.83 OTHER FATIGUE: ICD-10-CM

## 2023-03-30 DIAGNOSIS — R53.83 OTHER FATIGUE: Primary | ICD-10-CM

## 2023-03-30 PROCEDURE — 84443 ASSAY THYROID STIM HORMONE: CPT

## 2023-03-30 PROCEDURE — 83735 ASSAY OF MAGNESIUM: CPT

## 2023-03-30 PROCEDURE — 84439 ASSAY OF FREE THYROXINE: CPT

## 2023-03-30 PROCEDURE — 80053 COMPREHEN METABOLIC PANEL: CPT

## 2023-03-30 PROCEDURE — 85025 COMPLETE CBC W/AUTO DIFF WBC: CPT

## 2023-03-30 PROCEDURE — 82607 VITAMIN B-12: CPT

## 2023-03-30 PROCEDURE — 86618 LYME DISEASE ANTIBODY: CPT

## 2023-03-30 PROCEDURE — 86757 RICKETTSIA ANTIBODY: CPT

## 2023-03-31 LAB
ALBUMIN SERPL-MCNC: 4.5 G/DL (ref 3.5–5.2)
ALBUMIN/GLOB SERPL: 1.6 G/DL
ALP SERPL-CCNC: 67 U/L (ref 39–117)
ALT SERPL W P-5'-P-CCNC: 27 U/L (ref 1–33)
ANION GAP SERPL CALCULATED.3IONS-SCNC: 16.1 MMOL/L (ref 5–15)
AST SERPL-CCNC: 22 U/L (ref 1–32)
BASOPHILS # BLD AUTO: 0.06 10*3/MM3 (ref 0–0.2)
BASOPHILS NFR BLD AUTO: 0.6 % (ref 0–1.5)
BILIRUB SERPL-MCNC: 0.3 MG/DL (ref 0–1.2)
BUN SERPL-MCNC: 40 MG/DL (ref 8–23)
BUN/CREAT SERPL: 26.5 (ref 7–25)
CALCIUM SPEC-SCNC: 9.3 MG/DL (ref 8.6–10.5)
CHLORIDE SERPL-SCNC: 102 MMOL/L (ref 98–107)
CO2 SERPL-SCNC: 18.9 MMOL/L (ref 22–29)
CREAT SERPL-MCNC: 1.51 MG/DL (ref 0.57–1)
DEPRECATED RDW RBC AUTO: 47.8 FL (ref 37–54)
EGFRCR SERPLBLD CKD-EPI 2021: 35.7 ML/MIN/1.73
EOSINOPHIL # BLD AUTO: 0.41 10*3/MM3 (ref 0–0.4)
EOSINOPHIL NFR BLD AUTO: 4.3 % (ref 0.3–6.2)
ERYTHROCYTE [DISTWIDTH] IN BLOOD BY AUTOMATED COUNT: 14.4 % (ref 12.3–15.4)
GLOBULIN UR ELPH-MCNC: 2.8 GM/DL
GLUCOSE SERPL-MCNC: 123 MG/DL (ref 65–99)
HCT VFR BLD AUTO: 39.1 % (ref 34–46.6)
HGB BLD-MCNC: 12.8 G/DL (ref 12–15.9)
IMM GRANULOCYTES # BLD AUTO: 0.04 10*3/MM3 (ref 0–0.05)
IMM GRANULOCYTES NFR BLD AUTO: 0.4 % (ref 0–0.5)
LYMPHOCYTES # BLD AUTO: 1.84 10*3/MM3 (ref 0.7–3.1)
LYMPHOCYTES NFR BLD AUTO: 19.4 % (ref 19.6–45.3)
MAGNESIUM SERPL-MCNC: 2.1 MG/DL (ref 1.6–2.4)
MCH RBC QN AUTO: 29.5 PG (ref 26.6–33)
MCHC RBC AUTO-ENTMCNC: 32.7 G/DL (ref 31.5–35.7)
MCV RBC AUTO: 90.1 FL (ref 79–97)
MONOCYTES # BLD AUTO: 0.79 10*3/MM3 (ref 0.1–0.9)
MONOCYTES NFR BLD AUTO: 8.3 % (ref 5–12)
NEUTROPHILS NFR BLD AUTO: 6.34 10*3/MM3 (ref 1.7–7)
NEUTROPHILS NFR BLD AUTO: 67 % (ref 42.7–76)
NRBC BLD AUTO-RTO: 0 /100 WBC (ref 0–0.2)
PLATELET # BLD AUTO: 231 10*3/MM3 (ref 140–450)
PMV BLD AUTO: 11.3 FL (ref 6–12)
POTASSIUM SERPL-SCNC: 4.7 MMOL/L (ref 3.5–5.2)
PROT SERPL-MCNC: 7.3 G/DL (ref 6–8.5)
RBC # BLD AUTO: 4.34 10*6/MM3 (ref 3.77–5.28)
SODIUM SERPL-SCNC: 137 MMOL/L (ref 136–145)
T4 FREE SERPL-MCNC: 1.1 NG/DL (ref 0.93–1.7)
TSH SERPL DL<=0.05 MIU/L-ACNC: 3.58 UIU/ML (ref 0.27–4.2)
VIT B12 BLD-MCNC: 807 PG/ML (ref 211–946)
WBC NRBC COR # BLD: 9.48 10*3/MM3 (ref 3.4–10.8)

## 2023-04-01 LAB — B BURGDOR IGG+IGM SER QL IA: NEGATIVE

## 2023-04-03 ENCOUNTER — LAB (OUTPATIENT)
Dept: LAB | Facility: HOSPITAL | Age: 78
End: 2023-04-03
Payer: MEDICARE

## 2023-04-03 DIAGNOSIS — R53.83 OTHER FATIGUE: ICD-10-CM

## 2023-04-03 LAB — C DIFF TOX GENS STL QL NAA+PROBE: NEGATIVE

## 2023-04-03 PROCEDURE — 87177 OVA AND PARASITES SMEARS: CPT

## 2023-04-03 PROCEDURE — 87209 SMEAR COMPLEX STAIN: CPT

## 2023-04-03 PROCEDURE — 87328 CRYPTOSPORIDIUM AG IA: CPT

## 2023-04-03 PROCEDURE — 87046 STOOL CULTR AEROBIC BACT EA: CPT

## 2023-04-03 PROCEDURE — 87045 FECES CULTURE AEROBIC BACT: CPT

## 2023-04-03 PROCEDURE — 87493 C DIFF AMPLIFIED PROBE: CPT

## 2023-04-03 PROCEDURE — 87329 GIARDIA AG IA: CPT

## 2023-04-03 PROCEDURE — 87427 SHIGA-LIKE TOXIN AG IA: CPT

## 2023-04-04 LAB
RICK SF IGG TITR SER IF: ABNORMAL {TITER}
RICK SF IGM TITR SER IF: ABNORMAL {TITER}
RICK TYPHUS IGG TITR SER IF: ABNORMAL {TITER}
RICK TYPHUS IGM TITR SER IF: ABNORMAL {TITER}

## 2023-04-05 LAB
CRYPTOSP AG STL QL IA: NEGATIVE
G LAMBLIA AG STL QL IA: NEGATIVE

## 2023-04-07 LAB
BACTERIA SPEC CULT: NORMAL
BACTERIA SPEC CULT: NORMAL
CAMPYLOBACTER STL CULT: NORMAL
E COLI SXT STL QL IA: NEGATIVE
O+P SPEC MICRO: NORMAL
O+P STL CONC: NORMAL
SALM + SHIG STL CULT: NORMAL

## 2023-07-12 LAB
ALBUMIN SERPL-MCNC: 4.7 G/DL (ref 3.5–5.2)
ALBUMIN/GLOB SERPL: 1.8 G/DL
ALP SERPL-CCNC: 59 U/L (ref 39–117)
ALT SERPL W P-5'-P-CCNC: 20 U/L (ref 1–33)
ANION GAP SERPL CALCULATED.3IONS-SCNC: 11 MMOL/L (ref 5–15)
AST SERPL-CCNC: 21 U/L (ref 1–32)
BASOPHILS # BLD AUTO: 0.07 10*3/MM3 (ref 0–0.2)
BASOPHILS NFR BLD AUTO: 0.8 % (ref 0–1.5)
BILIRUB SERPL-MCNC: 0.3 MG/DL (ref 0–1.2)
BUN SERPL-MCNC: 23 MG/DL (ref 8–23)
BUN/CREAT SERPL: 22.1 (ref 7–25)
CALCIUM SPEC-SCNC: 10.3 MG/DL (ref 8.6–10.5)
CHLORIDE SERPL-SCNC: 109 MMOL/L (ref 98–107)
CO2 SERPL-SCNC: 20 MMOL/L (ref 22–29)
CREAT SERPL-MCNC: 1.04 MG/DL (ref 0.57–1)
DEPRECATED RDW RBC AUTO: 46.7 FL (ref 37–54)
EGFRCR SERPLBLD CKD-EPI 2021: 55.8 ML/MIN/1.73
EOSINOPHIL # BLD AUTO: 0.42 10*3/MM3 (ref 0–0.4)
EOSINOPHIL NFR BLD AUTO: 4.6 % (ref 0.3–6.2)
ERYTHROCYTE [DISTWIDTH] IN BLOOD BY AUTOMATED COUNT: 13.9 % (ref 12.3–15.4)
GLOBULIN UR ELPH-MCNC: 2.6 GM/DL
GLUCOSE SERPL-MCNC: 107 MG/DL (ref 65–99)
HCT VFR BLD AUTO: 41.2 % (ref 34–46.6)
HGB BLD-MCNC: 13.3 G/DL (ref 12–15.9)
IMM GRANULOCYTES # BLD AUTO: 0.02 10*3/MM3 (ref 0–0.05)
IMM GRANULOCYTES NFR BLD AUTO: 0.2 % (ref 0–0.5)
LYMPHOCYTES # BLD AUTO: 1.44 10*3/MM3 (ref 0.7–3.1)
LYMPHOCYTES NFR BLD AUTO: 15.7 % (ref 19.6–45.3)
MCH RBC QN AUTO: 29.4 PG (ref 26.6–33)
MCHC RBC AUTO-ENTMCNC: 32.3 G/DL (ref 31.5–35.7)
MCV RBC AUTO: 90.9 FL (ref 79–97)
MONOCYTES # BLD AUTO: 0.75 10*3/MM3 (ref 0.1–0.9)
MONOCYTES NFR BLD AUTO: 8.2 % (ref 5–12)
NEUTROPHILS NFR BLD AUTO: 6.48 10*3/MM3 (ref 1.7–7)
NEUTROPHILS NFR BLD AUTO: 70.5 % (ref 42.7–76)
NRBC BLD AUTO-RTO: 0 /100 WBC (ref 0–0.2)
PLATELET # BLD AUTO: 198 10*3/MM3 (ref 140–450)
PMV BLD AUTO: 11.1 FL (ref 6–12)
POTASSIUM SERPL-SCNC: 4.2 MMOL/L (ref 3.5–5.2)
PROT SERPL-MCNC: 7.3 G/DL (ref 6–8.5)
RBC # BLD AUTO: 4.53 10*6/MM3 (ref 3.77–5.28)
SODIUM SERPL-SCNC: 140 MMOL/L (ref 136–145)
WBC NRBC COR # BLD: 9.18 10*3/MM3 (ref 3.4–10.8)

## 2023-07-15 LAB — TACROLIMUS BLD LC/MS/MS-MCNC: 8.2 NG/ML (ref 2–20)

## 2023-07-24 ENCOUNTER — TRANSCRIBE ORDERS (OUTPATIENT)
Dept: LAB | Facility: HOSPITAL | Age: 78
End: 2023-07-24
Payer: MEDICARE

## 2023-07-24 DIAGNOSIS — Z94.4 LIVER REPLACED BY TRANSPLANT: Primary | ICD-10-CM

## 2023-08-09 ENCOUNTER — LAB (OUTPATIENT)
Dept: LAB | Facility: HOSPITAL | Age: 78
End: 2023-08-09
Payer: MEDICARE

## 2023-08-09 DIAGNOSIS — Z94.4 LIVER REPLACED BY TRANSPLANT: ICD-10-CM

## 2023-08-09 PROCEDURE — 80197 ASSAY OF TACROLIMUS: CPT

## 2023-08-09 PROCEDURE — 85025 COMPLETE CBC W/AUTO DIFF WBC: CPT

## 2023-08-09 PROCEDURE — 80053 COMPREHEN METABOLIC PANEL: CPT

## 2023-08-09 PROCEDURE — 36415 COLL VENOUS BLD VENIPUNCTURE: CPT

## 2023-08-10 LAB
ALBUMIN SERPL-MCNC: 4.3 G/DL (ref 3.5–5.2)
ALBUMIN/GLOB SERPL: 1.6 G/DL
ALP SERPL-CCNC: 57 U/L (ref 39–117)
ALT SERPL W P-5'-P-CCNC: 20 U/L (ref 1–33)
ANION GAP SERPL CALCULATED.3IONS-SCNC: 11 MMOL/L (ref 5–15)
AST SERPL-CCNC: 23 U/L (ref 1–32)
BASOPHILS # BLD AUTO: 0.06 10*3/MM3 (ref 0–0.2)
BASOPHILS NFR BLD AUTO: 0.7 % (ref 0–1.5)
BILIRUB SERPL-MCNC: 0.3 MG/DL (ref 0–1.2)
BUN SERPL-MCNC: 27 MG/DL (ref 8–23)
BUN/CREAT SERPL: 25.7 (ref 7–25)
CALCIUM SPEC-SCNC: 9.7 MG/DL (ref 8.6–10.5)
CHLORIDE SERPL-SCNC: 108 MMOL/L (ref 98–107)
CO2 SERPL-SCNC: 21 MMOL/L (ref 22–29)
CREAT SERPL-MCNC: 1.05 MG/DL (ref 0.57–1)
DEPRECATED RDW RBC AUTO: 45.8 FL (ref 37–54)
EGFRCR SERPLBLD CKD-EPI 2021: 55.2 ML/MIN/1.73
EOSINOPHIL # BLD AUTO: 0.46 10*3/MM3 (ref 0–0.4)
EOSINOPHIL NFR BLD AUTO: 5.6 % (ref 0.3–6.2)
ERYTHROCYTE [DISTWIDTH] IN BLOOD BY AUTOMATED COUNT: 14.1 % (ref 12.3–15.4)
GLOBULIN UR ELPH-MCNC: 2.7 GM/DL
GLUCOSE SERPL-MCNC: 97 MG/DL (ref 65–99)
HCT VFR BLD AUTO: 38.2 % (ref 34–46.6)
HGB BLD-MCNC: 12.7 G/DL (ref 12–15.9)
IMM GRANULOCYTES # BLD AUTO: 0.04 10*3/MM3 (ref 0–0.05)
IMM GRANULOCYTES NFR BLD AUTO: 0.5 % (ref 0–0.5)
LYMPHOCYTES # BLD AUTO: 1.5 10*3/MM3 (ref 0.7–3.1)
LYMPHOCYTES NFR BLD AUTO: 18.1 % (ref 19.6–45.3)
MCH RBC QN AUTO: 29.7 PG (ref 26.6–33)
MCHC RBC AUTO-ENTMCNC: 33.2 G/DL (ref 31.5–35.7)
MCV RBC AUTO: 89.3 FL (ref 79–97)
MONOCYTES # BLD AUTO: 0.8 10*3/MM3 (ref 0.1–0.9)
MONOCYTES NFR BLD AUTO: 9.7 % (ref 5–12)
NEUTROPHILS NFR BLD AUTO: 5.42 10*3/MM3 (ref 1.7–7)
NEUTROPHILS NFR BLD AUTO: 65.4 % (ref 42.7–76)
NRBC BLD AUTO-RTO: 0 /100 WBC (ref 0–0.2)
PLATELET # BLD AUTO: 200 10*3/MM3 (ref 140–450)
PMV BLD AUTO: 11.1 FL (ref 6–12)
POTASSIUM SERPL-SCNC: 4.5 MMOL/L (ref 3.5–5.2)
PROT SERPL-MCNC: 7 G/DL (ref 6–8.5)
RBC # BLD AUTO: 4.28 10*6/MM3 (ref 3.77–5.28)
SODIUM SERPL-SCNC: 140 MMOL/L (ref 136–145)
WBC NRBC COR # BLD: 8.28 10*3/MM3 (ref 3.4–10.8)

## 2023-08-13 LAB — TACROLIMUS BLD LC/MS/MS-MCNC: 4.4 NG/ML (ref 2–20)
